# Patient Record
Sex: FEMALE | Race: WHITE | NOT HISPANIC OR LATINO | Employment: OTHER | URBAN - METROPOLITAN AREA
[De-identification: names, ages, dates, MRNs, and addresses within clinical notes are randomized per-mention and may not be internally consistent; named-entity substitution may affect disease eponyms.]

---

## 2020-06-20 ENCOUNTER — NURSE TRIAGE (OUTPATIENT)
Dept: NURSING | Facility: CLINIC | Age: 65
End: 2020-06-20

## 2020-06-20 NOTE — TELEPHONE ENCOUNTER
"Caller bit on arms while outside by some type of flying insects 3 days ago; after had swelling of arms; tried to squeeze out 'stingers\"   and used topical  Lotions for  Itching   Today arms are red and swollen and painful in at bite sites; no fever      Triage protocol reviewed   Advised assessment at  or ED within 4 hours     Caller understands and will comply   Shruthi Dhillon RN  FNA      Additional Information    Negative: [1] Life-threatening reaction (anaphylaxis) in the past to same insect bite AND [2] < 2 hours since bite    Negative: Passed out (i.e., lost consciousness, collapsed and was not responding)    Negative: Difficulty breathing or wheezing    Negative: [1] Hoarseness or cough AND [2] sudden onset following bite    Negative: [1] Difficulty swallowing or slurred speech AND [2] sudden onset following bite    Negative: Sounds like a life-threatening emergency to the triager    Negative: Bee sting(s)    Negative: Spider bite(s)    Negative: Tick bite(s)    Negative: Mosquito bite(s)    Negative: Bed bug bite(s)    Negative: Boil suspected (i.e., painful red lump and NO insect bite)    Negative: Doesn't sound like an insect bite    Negative: Patient sounds very sick or weak to the triager    [1] Red streak or red line AND [2] length > 2 inches (5 cm)    Protocols used: INSECT BITE-A-AH      "

## 2020-09-22 ENCOUNTER — OFFICE VISIT (OUTPATIENT)
Dept: INTERNAL MEDICINE | Facility: CLINIC | Age: 65
End: 2020-09-22
Payer: MEDICARE

## 2020-09-22 VITALS
RESPIRATION RATE: 16 BRPM | OXYGEN SATURATION: 100 % | TEMPERATURE: 97.6 F | WEIGHT: 148 LBS | BODY MASS INDEX: 26.22 KG/M2 | SYSTOLIC BLOOD PRESSURE: 142 MMHG | HEART RATE: 86 BPM | DIASTOLIC BLOOD PRESSURE: 86 MMHG | HEIGHT: 63 IN

## 2020-09-22 DIAGNOSIS — J30.1 ALLERGIC RHINITIS DUE TO POLLEN, UNSPECIFIED SEASONALITY: ICD-10-CM

## 2020-09-22 DIAGNOSIS — Z23 NEED FOR PROPHYLACTIC VACCINATION AND INOCULATION AGAINST INFLUENZA: ICD-10-CM

## 2020-09-22 DIAGNOSIS — E11.9 TYPE 2 DIABETES MELLITUS WITHOUT COMPLICATION, WITHOUT LONG-TERM CURRENT USE OF INSULIN (H): Primary | ICD-10-CM

## 2020-09-22 DIAGNOSIS — E78.5 HYPERLIPIDEMIA LDL GOAL <100: ICD-10-CM

## 2020-09-22 DIAGNOSIS — I10 ESSENTIAL HYPERTENSION: ICD-10-CM

## 2020-09-22 DIAGNOSIS — F33.8 SEASONAL DEPRESSION (H): ICD-10-CM

## 2020-09-22 PROCEDURE — G0008 ADMIN INFLUENZA VIRUS VAC: HCPCS | Performed by: INTERNAL MEDICINE

## 2020-09-22 PROCEDURE — 90662 IIV NO PRSV INCREASED AG IM: CPT | Performed by: INTERNAL MEDICINE

## 2020-09-22 PROCEDURE — 99204 OFFICE O/P NEW MOD 45 MIN: CPT | Mod: 25 | Performed by: INTERNAL MEDICINE

## 2020-09-22 RX ORDER — SERTRALINE HYDROCHLORIDE 100 MG/1
100 TABLET, FILM COATED ORAL DAILY
Qty: 90 TABLET | Refills: 3 | Status: SHIPPED | OUTPATIENT
Start: 2020-09-22 | End: 2021-12-31

## 2020-09-22 RX ORDER — CETIRIZINE HYDROCHLORIDE 10 MG/1
10 TABLET ORAL DAILY
Qty: 90 TABLET | Refills: 3 | Status: SHIPPED | OUTPATIENT
Start: 2020-09-22 | End: 2022-01-11

## 2020-09-22 RX ORDER — ROSUVASTATIN CALCIUM 20 MG/1
20 TABLET, COATED ORAL DAILY
Qty: 90 TABLET | Refills: 3 | Status: SHIPPED | OUTPATIENT
Start: 2020-09-22 | End: 2021-12-31

## 2020-09-22 RX ORDER — LOSARTAN POTASSIUM AND HYDROCHLOROTHIAZIDE 25; 100 MG/1; MG/1
1 TABLET ORAL DAILY
Qty: 90 TABLET | Refills: 3 | Status: SHIPPED | OUTPATIENT
Start: 2020-09-22 | End: 2021-12-31

## 2020-09-22 ASSESSMENT — PAIN SCALES - GENERAL: PAINLEVEL: NO PAIN (0)

## 2020-09-22 ASSESSMENT — MIFFLIN-ST. JEOR: SCORE: 1176.48

## 2020-09-22 NOTE — PROGRESS NOTES
"Subjective     Shruthi Rivas is a 65 year old female who presents to clinic today for the following health issues:    HPI     Chief Complaint   Patient presents with     Establish Care     Pre-diabetes  Htn  Lipids  Depression/Anxiety     Retired this last month from [employer] [place] getting care there.  Now on medicare. Single, .   Daughter in [place], son in [place].  She now lives in [place].    Pre diabetes and tried metformin for 3 months, bothered stomach and had to quit that.  Does keto type diet.  a1c has been around 6.7 last one was lower 6.6   Lost weight and walking for 45 minutes.      Some elevated white blood counts. 11-15 range     Seasonal depression in the winter.    Mammogram in 12/19  Has done the stool test.     Past Medical History:   Diagnosis Date     Depressive disorder      Essential hypertension      Type 2 diabetes mellitus (H)      Current Outpatient Medications   Medication     aspirin (ASA) 81 MG EC tablet     cetirizine (ZYRTEC) 10 MG tablet     losartan-hydrochlorothiazide (HYZAAR) 100-25 MG tablet     rosuvastatin (CRESTOR) 20 MG tablet     sertraline (ZOLOFT) 100 MG tablet     No current facility-administered medications for this visit.      Social History     Tobacco Use     Smoking status: Never Smoker     Smokeless tobacco: Never Used   Substance Use Topics     Alcohol use: Not Currently     Drug use: Never     No alcohol.     Review of Systems   Constitutional, HEENT, cardiovascular, pulmonary, gi and gu systems are negative, except as otherwise noted.      Objective    BP (!) 142/86   Pulse 86   Temp 97.6  F (36.4  C) (Temporal)   Resp 16   Ht 1.594 m (5' 2.75\")   Wt 67.1 kg (148 lb)   SpO2 100%   BMI 26.43 kg/m    Body mass index is 26.43 kg/m .  Physical Exam   GENERAL: healthy, alert and no distress  RESP: lungs clear to auscultation - no rales, rhonchi or wheezes  CV: regular rate and rhythm, normal S1 S2, no S3 or S4, no murmur, click or rub, no peripheral " edema and peripheral pulses strong  MS: no gross musculoskeletal defects noted, no edema  SKIN: no suspicious lesions or rashes  NEURO: Normal strength and tone, mentation intact and speech normal  PSYCH: mentation appears normal, affect normal/bright            Assessment & Plan     Shruthi was seen today for establish care.    Diagnoses and all orders for this visit:    Type 2 diabetes mellitus without complication, without long-term current use of insulin (H)  -     rosuvastatin (CRESTOR) 20 MG tablet; Take 1 tablet (20 mg) by mouth daily  -     **A1C FUTURE anytime; Future  -     **Comprehensive metabolic panel FUTURE anytime; Future  -     **CBC with platelets FUTURE anytime; Future  -     Lipid panel reflex to direct LDL Fasting; Future  -     Albumin Random Urine Quantitative with Creat Ratio; Future    Essential hypertension  -     losartan-hydrochlorothiazide (HYZAAR) 100-25 MG tablet; Take 1 tablet by mouth daily  -     Albumin Random Urine Quantitative with Creat Ratio; Future    Hyperlipidemia LDL goal <100  -     rosuvastatin (CRESTOR) 20 MG tablet; Take 1 tablet (20 mg) by mouth daily  -     Lipid panel reflex to direct LDL Fasting; Future    Seasonal depression (H)  -     sertraline (ZOLOFT) 100 MG tablet; Take 1 tablet (100 mg) by mouth daily    Allergic rhinitis due to pollen, unspecified seasonality  -     cetirizine (ZYRTEC) 10 MG tablet; Take 1 tablet (10 mg) by mouth daily      This is a new patient to our clinic.  She has type 2 diabetes which is diet controlled.  A1c was 6.6, 6.7.  We will recheck her A1c in 2 months.  She will continue Crestor, add an aspirin.  Needs to get her blood pressure little better controlled.    Hypertension she is on losartan hydrochlorothiazide, we did refill this, blood pressure is 142/86 and hopefully with weight loss, exercise it will come down.    Hyperlipidemia treated with Crestor doing well we will check her lipids in December.    Seasonal depression patient  "is on Zoloft 100 mg a day seems to be doing okay today we will continue to follow this throughout the winter season.    She is given a flu shot.    She should follow-up in 2 months for labs and then probable clinic visit.          BMI:   Estimated body mass index is 26.43 kg/m  as calculated from the following:    Height as of this encounter: 1.594 m (5' 2.75\").    Weight as of this encounter: 67.1 kg (148 lb).       Return in about 2 months (around 11/22/2020) for Lab Work.    Luis Alston MD  [facility]    "

## 2020-09-22 NOTE — PROGRESS NOTES
Prior to immunization administration, verified patients identity using patient s name and date of birth. Please see Immunization Activity for additional information.     Screening Questionnaire for Adult Immunization    Are you sick today?   No   Do you have allergies to medications, food, a vaccine component or latex?   Yes   Have you ever had a serious reaction after receiving a vaccination?   No   Do you have a long-term health problem with heart, lung, kidney, or metabolic disease (e.g., diabetes), asthma, a blood disorder, no spleen, complement component deficiency, a cochlear implant, or a spinal fluid leak?  Are you on long-term aspirin therapy?   No   Do you have cancer, leukemia, HIV/AIDS, or any other immune system problem?   No   Do you have a parent, brother, or sister with an immune system problem?   No   In the past 3 months, have you taken medications that affect  your immune system, such as prednisone, other steroids, or anticancer drugs; drugs for the treatment of rheumatoid arthritis, Crohn s disease, or psoriasis; or have you had radiation treatments?   No   Have you had a seizure, or a brain or other nervous system problem?   No   During the past year, have you received a transfusion of blood or blood    products, or been given immune (gamma) globulin or antiviral drug?   No   For women: Are you pregnant or is there a chance you could become       pregnant during the next month?   No   Have you received any vaccinations in the past 4 weeks?   No     Immunization questionnaire was positive for at least one answer.  Notified Yes.        Per orders of Dr. Luis Alston, injection of HD Influenza given by Miriam Trean CMA. Patient instructed to remain in clinic for 15 minutes afterwards, and to report any adverse reaction to me immediately.       Screening performed by Miriam Teran CMA on 9/22/2020 at 10:51 AM.

## 2020-12-01 DIAGNOSIS — I10 ESSENTIAL HYPERTENSION: ICD-10-CM

## 2020-12-01 DIAGNOSIS — E11.9 TYPE 2 DIABETES MELLITUS WITHOUT COMPLICATION, WITHOUT LONG-TERM CURRENT USE OF INSULIN (H): ICD-10-CM

## 2020-12-01 DIAGNOSIS — E78.5 HYPERLIPIDEMIA LDL GOAL <100: ICD-10-CM

## 2020-12-01 RX ORDER — LOSARTAN POTASSIUM AND HYDROCHLOROTHIAZIDE 25; 100 MG/1; MG/1
1 TABLET ORAL DAILY
Qty: 90 TABLET | Refills: 3 | Status: CANCELLED | OUTPATIENT
Start: 2020-12-01

## 2020-12-01 RX ORDER — ROSUVASTATIN CALCIUM 20 MG/1
20 TABLET, COATED ORAL DAILY
Qty: 90 TABLET | Refills: 3 | Status: CANCELLED | OUTPATIENT
Start: 2020-12-01

## 2020-12-03 NOTE — TELEPHONE ENCOUNTER
Both medications (Losartan-hydrochlorothiazide and Crestor) were refilled for a year in September 2020.    JESUS MayN, RN  [facility]

## 2021-01-04 ENCOUNTER — HEALTH MAINTENANCE LETTER (OUTPATIENT)
Age: 66
End: 2021-01-04

## 2021-01-08 DIAGNOSIS — E78.5 HYPERLIPIDEMIA LDL GOAL <100: ICD-10-CM

## 2021-01-08 DIAGNOSIS — E11.9 TYPE 2 DIABETES MELLITUS WITHOUT COMPLICATION, WITHOUT LONG-TERM CURRENT USE OF INSULIN (H): ICD-10-CM

## 2021-01-08 DIAGNOSIS — I10 ESSENTIAL HYPERTENSION: ICD-10-CM

## 2021-01-08 LAB
ALBUMIN SERPL-MCNC: 4.3 G/DL (ref 3.4–5)
ALP SERPL-CCNC: 103 U/L (ref 40–150)
ALT SERPL W P-5'-P-CCNC: 43 U/L (ref 0–50)
ANION GAP SERPL CALCULATED.3IONS-SCNC: 5 MMOL/L (ref 3–14)
AST SERPL W P-5'-P-CCNC: 13 U/L (ref 0–45)
BILIRUB SERPL-MCNC: 0.7 MG/DL (ref 0.2–1.3)
BUN SERPL-MCNC: 24 MG/DL (ref 7–30)
CALCIUM SERPL-MCNC: 9.7 MG/DL (ref 8.5–10.1)
CHLORIDE SERPL-SCNC: 106 MMOL/L (ref 94–109)
CHOLEST SERPL-MCNC: 223 MG/DL
CO2 SERPL-SCNC: 30 MMOL/L (ref 20–32)
CREAT SERPL-MCNC: 0.7 MG/DL (ref 0.52–1.04)
CREAT UR-MCNC: 279 MG/DL
ERYTHROCYTE [DISTWIDTH] IN BLOOD BY AUTOMATED COUNT: 13.4 % (ref 10–15)
GFR SERPL CREATININE-BSD FRML MDRD: 90 ML/MIN/{1.73_M2}
GLUCOSE SERPL-MCNC: 125 MG/DL (ref 70–99)
HBA1C MFR BLD: 6.1 % (ref 0–5.6)
HCT VFR BLD AUTO: 43.9 % (ref 35–47)
HDLC SERPL-MCNC: 68 MG/DL
HGB BLD-MCNC: 14.2 G/DL (ref 11.7–15.7)
LDLC SERPL CALC-MCNC: 107 MG/DL
MCH RBC QN AUTO: 28.6 PG (ref 26.5–33)
MCHC RBC AUTO-ENTMCNC: 32.3 G/DL (ref 31.5–36.5)
MCV RBC AUTO: 88 FL (ref 78–100)
MICROALBUMIN UR-MCNC: 71 MG/L
MICROALBUMIN/CREAT UR: 25.34 MG/G CR (ref 0–25)
NONHDLC SERPL-MCNC: 155 MG/DL
PLATELET # BLD AUTO: 259 10E9/L (ref 150–450)
POTASSIUM SERPL-SCNC: 3.9 MMOL/L (ref 3.4–5.3)
PROT SERPL-MCNC: 8.2 G/DL (ref 6.8–8.8)
RBC # BLD AUTO: 4.97 10E12/L (ref 3.8–5.2)
SODIUM SERPL-SCNC: 141 MMOL/L (ref 133–144)
TRIGL SERPL-MCNC: 242 MG/DL
WBC # BLD AUTO: 11.5 10E9/L (ref 4–11)

## 2021-01-08 PROCEDURE — 36415 COLL VENOUS BLD VENIPUNCTURE: CPT | Performed by: INTERNAL MEDICINE

## 2021-01-08 PROCEDURE — 83036 HEMOGLOBIN GLYCOSYLATED A1C: CPT | Performed by: INTERNAL MEDICINE

## 2021-01-08 PROCEDURE — 85027 COMPLETE CBC AUTOMATED: CPT | Performed by: INTERNAL MEDICINE

## 2021-01-08 PROCEDURE — 80053 COMPREHEN METABOLIC PANEL: CPT | Performed by: INTERNAL MEDICINE

## 2021-01-08 PROCEDURE — 80061 LIPID PANEL: CPT | Performed by: INTERNAL MEDICINE

## 2021-01-08 PROCEDURE — 82043 UR ALBUMIN QUANTITATIVE: CPT | Performed by: INTERNAL MEDICINE

## 2021-01-10 ENCOUNTER — HOSPITAL ENCOUNTER (EMERGENCY)
Facility: CLINIC | Age: 66
Discharge: HOME | End: 2021-01-10
Attending: PHYSICIAN ASSISTANT | Admitting: PHYSICIAN ASSISTANT
Payer: MEDICARE

## 2021-01-10 VITALS
SYSTOLIC BLOOD PRESSURE: 134 MMHG | DIASTOLIC BLOOD PRESSURE: 87 MMHG | TEMPERATURE: 97.7 F | HEART RATE: 84 BPM | OXYGEN SATURATION: 97 % | RESPIRATION RATE: 18 BRPM

## 2021-01-10 DIAGNOSIS — S61.411A LACERATION OF RIGHT HAND: ICD-10-CM

## 2021-01-10 PROCEDURE — 99283 EMERGENCY DEPT VISIT LOW MDM: CPT | Performed by: PHYSICIAN ASSISTANT

## 2021-01-10 PROCEDURE — 12001 RPR S/N/AX/GEN/TRNK 2.5CM/<: CPT | Performed by: PHYSICIAN ASSISTANT

## 2021-01-10 PROCEDURE — 99282 EMERGENCY DEPT VISIT SF MDM: CPT | Mod: 25 | Performed by: PHYSICIAN ASSISTANT

## 2021-01-10 NOTE — ED AVS SNAPSHOT
[facility] Emergency Dept  [address] [place] [address]  Phone: [phone]  Fax: [phone]                                    Shruthi Rivas   MRN: [id number]    Department: [facility] Emergency Dept   Date of Visit: 1/10/2021           After Visit Summary Signature Page    I have received my discharge instructions, and my questions have been answered. I have discussed any challenges I see with this plan with the nurse or doctor.    ..........................................................................................................................................  Patient/Patient Representative Signature      ..........................................................................................................................................  Patient Representative Print Name and Relationship to Patient    ..................................................               ................................................  Date                                   Time    ..........................................................................................................................................  Reviewed by Signature/Title    ...................................................              ..............................................  Date                                               Time          22EPIC Rev 08/18

## 2021-01-11 NOTE — DISCHARGE INSTRUCTIONS
Please have the stitches removed in 1 week.  Cover with a bandage and antibiotic ointment.  Wash under running warm soapy water but do not submerge in water until stitches are removed.  If you develop any worsening concerns please do not hesitate to return to the emergency department.    Thank you for choosing [facility]'s Emergency Department. It was a pleasure taking care of you today. If you have any questions, please call [phone].    Marley Galvan PA-C

## 2021-01-11 NOTE — ED PROVIDER NOTES
History     Chief Complaint   Patient presents with     Laceration     HPI  Shruthi Rivas is a 65 year old female who presents to the emergency department for concerns of a laceration. The patient was cleaning a new  blade when it accidentally cut her right palm.  Bleeding was controlled prior to arrival.  She has normal range of motion of her fingers.  She denies any other injuries.  Last tetanus 2012.      Allergies:  Allergies   Allergen Reactions     Codeine      Vomiting      Dilaudid [Hydromorphone]      Eucalyptus Oil      Ibuprofen      Up set stomach, sweating, throw up, diarrhea      Morphine      vomiting     No Clinical Screening - See Comments      IV dye      Pravastatin      Shellfish-Derived Products      Sulfa Drugs        Problem List:    There are no active problems to display for this patient.       Past Medical History:    Past Medical History:   Diagnosis Date     Depressive disorder      Essential hypertension      Type 2 diabetes mellitus (H)        Past Surgical History:    Past Surgical History:   Procedure Laterality Date     BACK SURGERY  1993     WRIST SURGERY      fracture and then a plate.        Family History:    History reviewed. No pertinent family history.    Social History:  Marital Status:  Single [1]  Social History     Tobacco Use     Smoking status: Never Smoker     Smokeless tobacco: Never Used   Substance Use Topics     Alcohol use: Not Currently     Drug use: Never        Medications:         aspirin (ASA) 81 MG EC tablet       cetirizine (ZYRTEC) 10 MG tablet       losartan-hydrochlorothiazide (HYZAAR) 100-25 MG tablet       rosuvastatin (CRESTOR) 20 MG tablet       sertraline (ZOLOFT) 100 MG tablet          Review of Systems   All other systems reviewed and are negative.      Physical Exam   BP: 134/87  Pulse: 84  Temp: 97.7  F (36.5  C)  Resp: 18  SpO2: 97 %      Physical Exam  Vitals signs and nursing note reviewed.   Constitutional:       General: She is  not in acute distress.     Appearance: Normal appearance. She is not ill-appearing, toxic-appearing or diaphoretic.   HENT:      Head: Normocephalic and atraumatic.      Nose: Nose normal.   Eyes:      Extraocular Movements: Extraocular movements intact.      Conjunctiva/sclera: Conjunctivae normal.   Neck:      Musculoskeletal: Neck supple.   Cardiovascular:      Pulses: Normal pulses.   Pulmonary:      Effort: Pulmonary effort is normal. No respiratory distress.   Musculoskeletal:         General: No deformity.      Comments: Right hand: 0.5 cm laceration to the palmar hand overlying the first metacarpal.  No active bleeding, well approximated but some subcutaneous tissue exposed.  Normal range of motion of all fingers with normal sensation.  Brisk capillary refill.   Skin:     General: Skin is warm and dry.   Neurological:      General: No focal deficit present.      Mental Status: She is alert and oriented to person, place, and time. Mental status is at baseline.   Psychiatric:         Mood and Affect: Mood normal.         Behavior: Behavior normal.         ED [facility]    -Laceration Repair    Date/Time: 1/10/2021 8:58 PM  Performed by: Marley Galvan PA-C  Authorized by: Marley Galvan PA-C       ANESTHESIA (see MAR for exact dosages):     Anesthesia method:  Local infiltration    Local anesthetic:  Lidocaine 1% WITH epi  LACERATION DETAILS     Location:  Hand    Hand location:  R palm    Length (cm):  1.5    REPAIR TYPE:     Repair type:  Simple      EXPLORATION:     Wound exploration: wound explored through full range of motion and entire depth of wound probed and visualized      Wound extent: no signs of injury, no nerve damage, no tendon damage, no underlying fracture and no vascular damage      TREATMENT:     Area cleansed with:  Saline    Amount of cleaning:  Standard    Irrigation solution:  Sterile saline    SKIN REPAIR     Repair method:   Sutures    Suture size:  5-0    Suture material:  Nylon    Suture technique:  Simple interrupted    Number of sutures:  3    APPROXIMATION     Approximation:  Close    POST-PROCEDURE DETAILS     Dressing:  Adhesive bandage and antibiotic ointment      PROCEDURE   Patient Tolerance:  Patient tolerated the procedure well with no immediate complications          No results found for this or any previous visit (from the past 24 hour(s)).    Medications - No data to display       Assessments & Plan (with Medical Decision Making)  Shruthi Rivas is a 65 year old female who presented to the ED with a laceration to the right hand.  1.5 cm laceration noted, slightly gaping to right palm.  No underlying vascular injury or tendon injury.  Wound was repaired as detailed above.  Tetanus is currently up-to-date.  I advised suture removal in 1 week.  Patient was provided instructions on wound cares as well as indications of when to return to the ED.  All questions answered and patient discharged home in suitable condition.     I have reviewed the nursing notes.    I have reviewed the findings, diagnosis, plan and need for follow up with the patient.    Discharge Medication List as of 1/10/2021  8:43 PM          Final diagnoses:   Laceration of right hand     Note: Chart documentation done in part with Dragon Voice Recognition software. Although reviewed after completion, some word and grammatical errors may remain.    1/10/2021   [facility] EMERGENCY DEPT     Marley Galvan PA-C  01/10/21 2059

## 2021-01-19 ENCOUNTER — E-VISIT (OUTPATIENT)
Dept: URGENT CARE | Facility: URGENT CARE | Age: 66
End: 2021-01-19
Payer: MEDICARE

## 2021-01-19 DIAGNOSIS — J01.90 ACUTE SINUSITIS WITH SYMPTOMS > 10 DAYS: Primary | ICD-10-CM

## 2021-01-19 PROCEDURE — 99421 OL DIG E/M SVC 5-10 MIN: CPT | Performed by: PHYSICIAN ASSISTANT

## 2021-01-19 RX ORDER — FLUTICASONE PROPIONATE 50 MCG
1 SPRAY, SUSPENSION (ML) NASAL 2 TIMES DAILY
Qty: 18.2 ML | Refills: 3 | Status: SHIPPED | OUTPATIENT
Start: 2021-01-19 | End: 2021-01-20

## 2021-01-19 RX ORDER — DOXYCYCLINE HYCLATE 100 MG
100 TABLET ORAL 2 TIMES DAILY
Qty: 20 TABLET | Refills: 0 | Status: SHIPPED | OUTPATIENT
Start: 2021-01-19 | End: 2021-01-29

## 2021-01-19 NOTE — PATIENT INSTRUCTIONS
Dear Shruthi Rivas    After reviewing your responses, I've been able to diagnose you with?a sinus infection caused by bacteria.?     Based on your responses and diagnosis, I have prescribed doxycyline to treat your symptoms. I have sent this to your pharmacy.?     It is also important to stay well hydrated, get lots of rest and take over-the-counter decongestants,?tylenol?or ibuprofen if you?are able to?take those medications per your primary care provider to help relieve discomfort.?     It is important that you take?all of?your prescribed medication even if your symptoms are improving after a few doses.? Taking?all of?your medicine helps prevent the symptoms from returning.?     If your symptoms worsen, you develop severe headache, vomiting, high fever (>102), or are not improving in 7 days, please contact your primary care provider for an appointment or visit any of our convenient Walk-in Care or Urgent Care Centers to be seen which can be found on our website?here.?     Thanks again for choosing?us?as your health care partner,?   ?  Apple Nguyen PA-C?     Sinusitis (Antibiotic Treatment)    The sinuses are air-filled spaces within the bones of the face. They connect to the inside of the nose. Sinusitis is an inflammation of the tissue that lines the sinuses. Sinusitis can occur during a cold. It can also happen due to allergies to pollens and other particles in the air. Sinusitis can cause symptoms of sinus congestion and a feeling of fullness. A sinus infection causes fever, headache, and facial pain. There is often green or yellow fluid draining from the nose or into the back of the throat (post-nasal drip). You have been given antibiotics to treat this condition.   Home care    Take the full course of antibiotics as instructed. Don't stop taking them, even when you feel better.    Drink plenty of water, hot tea, and other liquids as directed by the healthcare provider. This may help thin nasal mucus.  It also may help your sinuses drain fluids.    Heat may help soothe painful areas of your face. Use a towel soaked in hot water. Or,  the shower and direct the warm spray onto your face. Using a vaporizer along with a menthol rub at night may also help soothe symptoms.     An expectorant with guaifenesin may help thin nasal mucus and help your sinuses drain fluids. Talk with your provider or pharmacists before taking an over-the-counter (OTC) medicine if you have any questions about it or its side effects..    You can use an OTC decongestant, unless a similar medicine was prescribed to you. Nasal sprays work the fastest. Use one that contains phenylephrine or oxymetazoline. First blow your nose gently. Then use the spray. Don't use these medicines more often than directed on the label. If you do, your symptoms may get worse. You may also take pills that contain pseudoephedrine. Don t use products that combine multiple medicines. This is because side effects may be increased. Read labels. You can also ask the pharmacist for help. (People with high blood pressure should not use decongestants. They can raise blood pressure.) Talk with your provider or pharmacist if you have any questions about the medicine..    OTC antihistamines may help if allergies contributed to your sinusitis. Talk with your provider or pharmacist if you have any questions about the medicine..    Don't use nasal rinses or irrigation during an acute sinus infection, unless your healthcare provider tells you to. Rinsing may spread the infection to other areas in your sinuses.    Use acetaminophen or ibuprofen to control pain, unless another pain medicine was prescribed to you. If you have chronic liver or kidney disease or ever had a stomach ulcer, talk with your healthcare provider before using these medicines. Never give aspirin to anyone under age 18 who is ill with a fever. It may cause severe liver damage.    Don't smoke. This can make  symptoms worse.    Follow-up care  Follow up with your healthcare provider, or as advised.   When to seek medical advice  Call your healthcare provider if any of these occur:     Facial pain or headache that gets worse    Stiff neck    Unusual drowsiness or confusion    Swelling of your forehead or eyelids    Symptoms don't go away in 10 days    Vision problems, such as blurred or double vision    Fever of 100.4 F (38 C) or higher, or as directed by your healthcare provider  Call 911  Call 911 if any of these occur:     Seizure    Trouble breathing    Feeling dizzy or faint    Fingernails, skin or lips look blue, purple , or gray  Prevention  Here are steps you can take to help prevent an infection:     Keep good hand washing habits.    Don t have close contact with people who have sore throats, colds, or other upper respiratory infections.    Don t smoke, and stay away from secondhand smoke.    Stay up to date with of your vaccines.  [employer] last reviewed this educational content on 12/1/2019 2000-2020 [employer] [address] [place] [address], [place] [address]. All rights reserved. This information is not intended as a substitute for professional medical care. Always follow your healthcare professional's instructions.

## 2021-01-20 ENCOUNTER — TELEPHONE (OUTPATIENT)
Dept: URGENT CARE | Facility: URGENT CARE | Age: 66
End: 2021-01-20

## 2021-01-20 DIAGNOSIS — J01.90 ACUTE SINUSITIS WITH SYMPTOMS > 10 DAYS: ICD-10-CM

## 2021-01-20 RX ORDER — FLUTICASONE PROPIONATE 50 MCG
1 SPRAY, SUSPENSION (ML) NASAL 2 TIMES DAILY
Qty: 18.2 ML | Refills: 3 | Status: SHIPPED | OUTPATIENT
Start: 2021-01-20 | End: 2021-01-20

## 2021-01-20 RX ORDER — FLUTICASONE PROPIONATE 50 MCG
1 SPRAY, SUSPENSION (ML) NASAL 2 TIMES DAILY
Qty: 48 ML | Refills: 2 | Status: SHIPPED | OUTPATIENT
Start: 2021-01-20 | End: 2021-03-12

## 2021-01-20 NOTE — TELEPHONE ENCOUNTER
[facility] asking if 90 day Rx can be dispensed for the Fluticasone 50 mg nasal spray  Original quantity: 16  Quantity requested 48

## 2021-02-12 ENCOUNTER — OFFICE VISIT (OUTPATIENT)
Dept: INTERNAL MEDICINE | Facility: CLINIC | Age: 66
End: 2021-02-12
Payer: MEDICARE

## 2021-02-12 VITALS
DIASTOLIC BLOOD PRESSURE: 66 MMHG | HEART RATE: 102 BPM | OXYGEN SATURATION: 98 % | WEIGHT: 140 LBS | BODY MASS INDEX: 24.8 KG/M2 | HEIGHT: 63 IN | RESPIRATION RATE: 16 BRPM | SYSTOLIC BLOOD PRESSURE: 108 MMHG | TEMPERATURE: 97 F

## 2021-02-12 DIAGNOSIS — D17.30 LIPOMA OF SKIN AND SUBCUTANEOUS TISSUE: Primary | ICD-10-CM

## 2021-02-12 PROCEDURE — 99213 OFFICE O/P EST LOW 20 MIN: CPT | Performed by: INTERNAL MEDICINE

## 2021-02-12 ASSESSMENT — MIFFLIN-ST. JEOR: SCORE: 1140.2

## 2021-02-12 ASSESSMENT — PAIN SCALES - GENERAL: PAINLEVEL: NO PAIN (0)

## 2021-02-12 NOTE — PROGRESS NOTES
"    Assessment & Plan     Lipoma of skin and subcutaneous tissue  Unsure if this is what is causing the lump she felt.  I think is the upper back lesion 2 and half inches in size which is lipoma she is reassured is benign no sign of drainage.  Lower back may be had a cyst that ruptured but I assume it was a lipoma as well.  Maybe she just feels in a different location.  She has multiple lipomas, offered her general surgery consult to get these removed in the future once her been cancer on her face is cared for.                   No follow-ups on file.    Luis Alston MD  [facility]    Shravan Hawkins is a 65 year old who presents for the following health issues     HPI       Chief Complaint   Patient presents with     Mass     check lump by spine, massage therapist noticed it     Lump by her spine found on massage.  Was a little sore when pushed on.  Patient hadn't noticed it just found this week.      Basal cell carcinoma under left eye.      Review of Systems         Objective    /66   Pulse 102   Temp 97  F (36.1  C) (Temporal)   Resp 16   Ht 1.594 m (5' 2.75\")   Wt 63.5 kg (140 lb)   SpO2 98%   BMI 25.00 kg/m    Body mass index is 25 kg/m .  Physical Exam   NAD  Small lipoma 1 cm in size in the left clavicular area  Right lower back has no mass palpable today.  Right upper back has a 2 and half inch palpable lipoma.                "

## 2021-03-12 ENCOUNTER — OFFICE VISIT (OUTPATIENT)
Dept: INTERNAL MEDICINE | Facility: CLINIC | Age: 66
End: 2021-03-12
Payer: MEDICARE

## 2021-03-12 VITALS
OXYGEN SATURATION: 97 % | SYSTOLIC BLOOD PRESSURE: 122 MMHG | HEART RATE: 98 BPM | WEIGHT: 143 LBS | BODY MASS INDEX: 25.53 KG/M2 | TEMPERATURE: 97.3 F | RESPIRATION RATE: 16 BRPM | DIASTOLIC BLOOD PRESSURE: 74 MMHG

## 2021-03-12 DIAGNOSIS — C44.310 BCC (BASAL CELL CARCINOMA), FACE: ICD-10-CM

## 2021-03-12 DIAGNOSIS — Z01.818 PREOP GENERAL PHYSICAL EXAM: Primary | ICD-10-CM

## 2021-03-12 PROCEDURE — 99213 OFFICE O/P EST LOW 20 MIN: CPT | Performed by: INTERNAL MEDICINE

## 2021-03-12 ASSESSMENT — PAIN SCALES - GENERAL: PAINLEVEL: NO PAIN (0)

## 2021-03-12 NOTE — PATIENT INSTRUCTIONS

## 2021-03-12 NOTE — PROGRESS NOTES
[address] [address]  Phone: [phone]  Primary Provider: Luis Alston  Pre-op Performing Provider: LUIS ALSTON    PREOPERATIVE EVALUATION:  Today's date: 3/12/2021    Shruthi Rivas is a 65 year old female who presents for a preoperative evaluation.    Surgical Information:  Surgery/Procedure: MOH's remove basal cell carcinoma   Surgery Location: [facility]  Surgeon: Dr. Galindo  Surgery Date: 3/18/2021  Time of Surgery: 7:00 am arrival  Where patient plans to recover: At home with family  Fax number for surgical facility: [phone]    Type of Anesthesia Anticipated: tbd    Assessment & Plan     The proposed surgical procedure is considered INTERMEDIATE risk.    Problem List Items Addressed This Visit     None      Visit Diagnoses     Preop general physical exam    -  Primary    BCC (basal cell carcinoma), face            Patient is clear for her preoperative evaluation.  Her labs from January were stable and her sent with her preop.  She she will hold her medications on the day of surgery.  No reason for an EKG as she has no cardiac symptoms or cardiac history.      Possible Sleep Apnea:  Nursing to monitor post operative.         Risks and Recommendations:  The patient has the following additional risks and recommendations for perioperative complications:   - No identified additional risk factors other than previously addressed    Medication Instructions:  Hold all medications on day of surgery    RECOMMENDATION:  APPROVAL GIVEN to proceed with proposed procedure, without further diagnostic evaluation.                      Subjective     HPI related to upcoming procedure: Left basal cell carcinoma, may need plastic surgery to fix so may need sedation and anesthesia.     Preop Questions 3/9/2021   1. Have you ever had a heart attack or stroke? No   2. Have you ever had surgery on your heart or blood vessels, such as a  stent placement, a coronary artery bypass, or surgery on an artery in your head, neck, heart, or legs? No   3. Do you have chest pain with activity? No   4. Do you have a history of  heart failure? No   5. Do you currently have a cold, bronchitis or symptoms of other infection? No   6. Do you have a cough, shortness of breath, or wheezing? No   7. Do you or anyone in your family have previous history of blood clots? YES -    8. Do you or does anyone in your family have a serious bleeding problem such as prolonged bleeding following surgeries or cuts? YES -    9. Have you ever had problems with anemia or been told to take iron pills? No   10. Have you had any abnormal blood loss such as black, tarry or bloody stools, or abnormal vaginal bleeding? No   11. Have you ever had a blood transfusion? YES -    11a. Have you ever had a transfusion reaction? UNKNOWN -    12. Are you willing to have a blood transfusion if it is medically needed before, during, or after your surgery? Yes   13. Have you or any of your relatives ever had problems with anesthesia? YES -    14. Do you have sleep apnea, excessive snoring or daytime drowsiness? YES - unknown   14a. Do you have a CPAP machine? No   15. Do you have any artifical heart valves or other implanted medical devices like a pacemaker, defibrillator, or continuous glucose monitor? No   16. Do you have artificial joints? No   17. Are you allergic to latex? No       Health Care Directive:  Patient does not have a Health Care Directive or Living Will:     Preoperative Review of :  Done       Status of Chronic Conditions:  HYPERLIPIDEMIA - Patient has a long history of significant Hyperlipidemia requiring medication for treatment with recent good control. Patient reports no problems or side effects with the medication.     HYPERTENSION - Patient has longstanding history of HTN , currently denies any symptoms referable to elevated blood pressure. Specifically denies chest pain,  palpitations, dyspnea, orthopnea, PND or peripheral edema. Blood pressure readings have been in normal range. Current medication regimen is as listed below. Patient denies any side effects of medication.       Review of Systems  Constitutional, neuro, ENT, endocrine, pulmonary, cardiac, gastrointestinal, genitourinary, musculoskeletal, integument and psychiatric systems are negative, except as otherwise noted.    There are no active problems to display for this patient.     Past Medical History:   Diagnosis Date     Depressive disorder      Essential hypertension      Type 2 diabetes mellitus (H)      Past Surgical History:   Procedure Laterality Date     BACK SURGERY  1993     WRIST SURGERY      fracture and then a plate.      Current Outpatient Medications   Medication Sig Dispense Refill     cetirizine (ZYRTEC) 10 MG tablet Take 1 tablet (10 mg) by mouth daily 90 tablet 3     losartan-hydrochlorothiazide (HYZAAR) 100-25 MG tablet Take 1 tablet by mouth daily 90 tablet 3     rosuvastatin (CRESTOR) 20 MG tablet Take 1 tablet (20 mg) by mouth daily 90 tablet 3     sertraline (ZOLOFT) 100 MG tablet Take 1 tablet (100 mg) by mouth daily 90 tablet 3       Allergies   Allergen Reactions     Codeine      Vomiting      Dilaudid [Hydromorphone]      Eucalyptus Oil      Ibuprofen      Up set stomach, sweating, throw up, diarrhea      Morphine      vomiting     No Clinical Screening - See Comments      IV dye      Pravastatin      Shellfish-Derived Products      Sulfa Drugs         Social History     Tobacco Use     Smoking status: Never Smoker     Smokeless tobacco: Never Used   Substance Use Topics     Alcohol use: Not Currently       History   Drug Use Unknown         Objective     /74 (Cuff Size: Adult Regular)   Pulse 98   Temp 97.3  F (36.3  C) (Temporal)   Resp 16   Wt 64.9 kg (143 lb)   SpO2 97%   BMI 25.53 kg/m      Physical Exam    GENERAL APPEARANCE: healthy, alert and no distress     NECK: no  adenopathy, no asymmetry, masses, or scars and thyroid normal to palpation     RESP: lungs clear to auscultation - no rales, rhonchi or wheezes     CV: regular rates and rhythm, normal S1 S2, no S3 or S4 and no murmur, click or rub     ABDOMEN:  soft, nontender, no HSM or masses and bowel sounds normal     MS: extremities normal- no gross deformities noted, no evidence of inflammation in joints, FROM in all extremities.     SKIN: no suspicious lesions or rashes     NEURO: Normal strength and tone, sensory exam grossly normal, mentation intact and speech normal     PSYCH: mentation appears normal. and affect normal/bright     LYMPHATICS: No cervical adenopathy    Recent Labs   Lab Test 01/08/21  0951   HGB 14.2         POTASSIUM 3.9   CR 0.70   A1C 6.1*        Diagnostics:  No labs were ordered during this visit.   No EKG required for low risk surgery (cataract, skin procedure, breast biopsy, etc).  No EKG required, no history of coronary heart disease, significant arrhythmia, peripheral arterial disease or other structural heart disease.    Revised Cardiac Risk Index (RCRI):  The patient has the following serious cardiovascular risks for perioperative complications:   - No serious cardiac risks = 0 points     RCRI Interpretation: 1 point: Class II (low risk - 0.9% complication rate)           Signed Electronically by: Luis Alston MD  Copy of this evaluation report is provided to requesting physician.

## 2021-05-01 ENCOUNTER — HEALTH MAINTENANCE LETTER (OUTPATIENT)
Age: 66
End: 2021-05-01

## 2021-07-13 ENCOUNTER — MYC MEDICAL ADVICE (OUTPATIENT)
Dept: INTERNAL MEDICINE | Facility: CLINIC | Age: 66
End: 2021-07-13

## 2021-07-13 DIAGNOSIS — E11.9 TYPE 2 DIABETES MELLITUS WITHOUT COMPLICATION, WITHOUT LONG-TERM CURRENT USE OF INSULIN (H): Primary | ICD-10-CM

## 2021-08-05 ENCOUNTER — APPOINTMENT (OUTPATIENT)
Dept: GENERAL RADIOLOGY | Facility: CLINIC | Age: 66
End: 2021-08-05
Attending: FAMILY MEDICINE
Payer: MEDICARE

## 2021-08-05 ENCOUNTER — HOSPITAL ENCOUNTER (EMERGENCY)
Facility: CLINIC | Age: 66
Discharge: HOME | End: 2021-08-05
Attending: FAMILY MEDICINE | Admitting: FAMILY MEDICINE
Payer: MEDICARE

## 2021-08-05 VITALS
BODY MASS INDEX: 24.28 KG/M2 | OXYGEN SATURATION: 97 % | WEIGHT: 136 LBS | DIASTOLIC BLOOD PRESSURE: 86 MMHG | SYSTOLIC BLOOD PRESSURE: 134 MMHG | RESPIRATION RATE: 20 BRPM | HEART RATE: 84 BPM | TEMPERATURE: 99.9 F

## 2021-08-05 DIAGNOSIS — S82.62XA CLOSED AVULSION FRACTURE OF LATERAL MALLEOLUS OF LEFT FIBULA, INITIAL ENCOUNTER: ICD-10-CM

## 2021-08-05 PROCEDURE — 99284 EMERGENCY DEPT VISIT MOD MDM: CPT | Mod: 25 | Performed by: FAMILY MEDICINE

## 2021-08-05 PROCEDURE — 73610 X-RAY EXAM OF ANKLE: CPT | Mod: LT

## 2021-08-05 PROCEDURE — 27786 TREATMENT OF ANKLE FRACTURE: CPT | Mod: 54 | Performed by: FAMILY MEDICINE

## 2021-08-05 PROCEDURE — 27786 TREATMENT OF ANKLE FRACTURE: CPT | Mod: LT | Performed by: FAMILY MEDICINE

## 2021-08-05 PROCEDURE — 73590 X-RAY EXAM OF LOWER LEG: CPT | Mod: LT

## 2021-08-05 RX ORDER — MULTIVITAMIN/IRON/FOLIC ACID 18MG-0.4MG
1 TABLET ORAL DAILY
COMMUNITY

## 2021-08-05 RX ORDER — MULTIVIT-MIN/IRON/FOLIC ACID/K 18-600-40
1000 CAPSULE ORAL DAILY
COMMUNITY

## 2021-08-05 RX ORDER — ACETAMINOPHEN 500 MG
1000 TABLET ORAL EVERY 6 HOURS PRN
COMMUNITY
End: 2021-08-05

## 2021-08-05 RX ORDER — ACETAMINOPHEN 500 MG
500-1000 TABLET ORAL EVERY 6 HOURS PRN
Refills: 0 | COMMUNITY
Start: 2021-08-05 | End: 2021-08-09

## 2021-08-05 ASSESSMENT — ENCOUNTER SYMPTOMS
JOINT SWELLING: 1
ARTHRALGIAS: 1

## 2021-08-06 NOTE — ED PROVIDER NOTES
History     Chief Complaint   Patient presents with     Leg Pain     HPI  Shruthi Rivas is a 66 year old female who presents to the emergency room today secondary to injury to her left lower leg.  Patient states that she was out on her 1 hour long walk when she stepped from the road to a grassy area causing her to twist her leg and call area on the left.  She was able to finish her walk but has had increasing swelling and pain from the ankle to the lateral left knee.  She denies any previous injury to this area.  She has noticed significant swelling to the left lateral ankle area since the incident.    Allergies:  Allergies   Allergen Reactions     Diagnostic X-Ray Materials Diarrhea and Nausea and Vomiting     Iodine Anaphylaxis     Codeine      Vomiting      Dilaudid [Hydromorphone]      Eucalyptus Oil      Ibuprofen      Up set stomach, sweating, throw up, diarrhea      Morphine      vomiting     Other [No Clinical Screening - See Comments]      IV dye      Pravastatin      Shellfish-Derived Products      Sulfa Drugs        Problem List:    There are no problems to display for this patient.       Past Medical History:    Past Medical History:   Diagnosis Date     Depressive disorder      Essential hypertension      Type 2 diabetes mellitus (H)        Past Surgical History:    Past Surgical History:   Procedure Laterality Date     BACK SURGERY  1993     WRIST SURGERY      fracture and then a plate.        Family History:    No family history on file.    Social History:  Marital Status:   [4]  Social History     Tobacco Use     Smoking status: Never Smoker     Smokeless tobacco: Never Used   Substance Use Topics     Alcohol use: Not Currently     Drug use: Never        Medications:    acetaminophen (TYLENOL) 500 MG tablet  cetirizine (ZYRTEC) 10 MG tablet  losartan-hydrochlorothiazide (HYZAAR) 100-25 MG tablet  multivitamin w/minerals (CENTRUM ADULTS) tablet  sertraline (ZOLOFT) 100 MG tablet  Vitamin  D, Cholecalciferol, 25 MCG (1000 UT) TABS  rosuvastatin (CRESTOR) 20 MG tablet          Review of Systems   Musculoskeletal: Positive for arthralgias (left lateral ankle, lower leg) and joint swelling (left ankle).   All other systems reviewed and are negative.      Physical Exam   BP: 134/86  Pulse: 84  Temp: 99.9  F (37.7  C)  Resp: 20  Weight: 61.7 kg (136 lb)  SpO2: 97 %      Physical Exam  Vitals and nursing note reviewed.   Musculoskeletal:      Left ankle: Swelling present. No deformity, ecchymosis or lacerations. Tenderness present over the lateral malleolus. Normal pulse.      Left Achilles Tendon: Normal.        Feet:    Feet:      Left foot:      Skin integrity: Skin integrity normal.         ED Course        Procedures              Critical Care time:  none               Results for orders placed or performed during the hospital encounter of 08/05/21 (from the past 24 hour(s))   XR Ankle Left G/E 3 Views    Narrative    EXAM: XR ANKLE LEFT G/E 3 VIEWS  LOCATION: [facility]  DATE/TIME: 8/5/2021 8:06 PM    INDICATION: fall, twisting, pain  COMPARISON: None.      Impression    IMPRESSION: Tiny linear radiodensity at the lateral malleolus distally is compatible with an age-indeterminate avulsion fracture. Soft tissue swelling over the lateral malleolus. There is normal joint spacing and alignment. The ankle mortise is   congruent. Small ankle joint effusion. Small plantar calcaneal enthesophyte.   XR Tibia & Fibula Left 2 Views    Narrative    EXAM: XR TIBIA and FIBULA LT 2 VW  LOCATION: [facility]  DATE/TIME: 8/5/2021 8:06 PM    INDICATION: fall, pain  COMPARISON: None.      Impression    IMPRESSION: Normal tibia and fibula.           Assessments & Plan (with Medical Decision Making)  Small avulsion fracture noted to the left lateral malleolus area.  Patient treated with a Ace wrap and stirrup splint with instructions appropriate use of both of  these modalities.  She was given written instructions regarding the ankle injury.  To follow-up in her clinic if not improved over the next 10 days.     I have reviewed the nursing notes.    I have reviewed the findings, diagnosis, plan and need for follow up with the patient.       New Prescriptions    ACETAMINOPHEN (TYLENOL) 500 MG TABLET    Take 1-2 tablets (500-1,000 mg) by mouth every 6 hours as needed for pain       Final diagnoses:   Closed avulsion fracture of lateral malleolus of left fibula, initial encounter       8/5/2021   [facility] EMERGENCY DEPT     Jose Enrique Pinedo, DO  08/05/21 2042

## 2021-08-06 NOTE — DISCHARGE INSTRUCTIONS
Please read and follow the handout(s) instructions. Return, if needed, for increased or worsening symptoms and as directed by the handout(s).    You may apply ice or cold packs to the area for 10-15 minutes every 1-2 hours as needed to relieve pain and/or swelling.    Wean from using the Ace wrap as your swelling improves.  Continue the use of the stirrup splint until your pain is resolving.

## 2021-08-06 NOTE — ED TRIAGE NOTES
Patient c/o lateral LLE swelling from just distal to her knee down to her ankle. She fell off a sidewalk while walking at 1300 today.

## 2021-08-15 ENCOUNTER — HEALTH MAINTENANCE LETTER (OUTPATIENT)
Age: 66
End: 2021-08-15

## 2021-10-10 ENCOUNTER — HEALTH MAINTENANCE LETTER (OUTPATIENT)
Age: 66
End: 2021-10-10

## 2021-10-20 ENCOUNTER — ALLIED HEALTH/NURSE VISIT (OUTPATIENT)
Dept: FAMILY MEDICINE | Facility: CLINIC | Age: 66
End: 2021-10-20
Payer: MEDICARE

## 2021-10-20 DIAGNOSIS — Z23 NEED FOR VACCINATION: Primary | ICD-10-CM

## 2021-10-20 PROCEDURE — 90750 HZV VACC RECOMBINANT IM: CPT

## 2021-10-20 PROCEDURE — 90471 IMMUNIZATION ADMIN: CPT

## 2021-10-20 PROCEDURE — 99207 PR NO CHARGE NURSE ONLY: CPT

## 2021-10-20 NOTE — PROGRESS NOTES
Shinrix given - patient previously had zostavax so needed to start series. Appt made in 2 months for second Shinrix. Patient tolerated well and no reaction to previous reactions.    Zina Arce on 10/20/2021 at 3:03 PM

## 2021-12-05 ENCOUNTER — HEALTH MAINTENANCE LETTER (OUTPATIENT)
Age: 66
End: 2021-12-05

## 2021-12-31 DIAGNOSIS — E78.5 HYPERLIPIDEMIA LDL GOAL <100: ICD-10-CM

## 2021-12-31 DIAGNOSIS — E11.9 TYPE 2 DIABETES MELLITUS WITHOUT COMPLICATION, WITHOUT LONG-TERM CURRENT USE OF INSULIN (H): ICD-10-CM

## 2021-12-31 DIAGNOSIS — I10 ESSENTIAL HYPERTENSION: ICD-10-CM

## 2021-12-31 DIAGNOSIS — F33.8 SEASONAL DEPRESSION (H): ICD-10-CM

## 2021-12-31 RX ORDER — LOSARTAN POTASSIUM AND HYDROCHLOROTHIAZIDE 25; 100 MG/1; MG/1
1 TABLET ORAL DAILY
Qty: 90 TABLET | Refills: 0 | Status: SHIPPED | OUTPATIENT
Start: 2021-12-31 | End: 2022-01-11

## 2021-12-31 RX ORDER — ROSUVASTATIN CALCIUM 20 MG/1
20 TABLET, COATED ORAL DAILY
Qty: 90 TABLET | Refills: 0 | Status: SHIPPED | OUTPATIENT
Start: 2021-12-31 | End: 2022-01-11

## 2021-12-31 RX ORDER — SERTRALINE HYDROCHLORIDE 100 MG/1
100 TABLET, FILM COATED ORAL DAILY
Qty: 90 TABLET | Refills: 0 | Status: SHIPPED | OUTPATIENT
Start: 2021-12-31 | End: 2022-01-11

## 2021-12-31 NOTE — TELEPHONE ENCOUNTER
Hyzaar  Routing refill request to provider for review/approval because:  A break in medication    Zoloft  Routing refill request to provider for review/approval because:  A break in medication      Crestor  Routing refill request to provider for review/approval because:  A break in medication    Deana Linares RN

## 2022-01-11 ENCOUNTER — OFFICE VISIT (OUTPATIENT)
Dept: INTERNAL MEDICINE | Facility: CLINIC | Age: 67
End: 2022-01-11
Payer: MEDICARE

## 2022-01-11 VITALS
HEIGHT: 63 IN | WEIGHT: 129.3 LBS | SYSTOLIC BLOOD PRESSURE: 132 MMHG | RESPIRATION RATE: 16 BRPM | BODY MASS INDEX: 22.91 KG/M2 | TEMPERATURE: 97.8 F | DIASTOLIC BLOOD PRESSURE: 80 MMHG | OXYGEN SATURATION: 98 % | HEART RATE: 95 BPM

## 2022-01-11 DIAGNOSIS — J30.1 ALLERGIC RHINITIS DUE TO POLLEN, UNSPECIFIED SEASONALITY: ICD-10-CM

## 2022-01-11 DIAGNOSIS — F33.8 SEASONAL DEPRESSION (H): ICD-10-CM

## 2022-01-11 DIAGNOSIS — E11.9 TYPE 2 DIABETES MELLITUS WITHOUT COMPLICATION, WITHOUT LONG-TERM CURRENT USE OF INSULIN (H): ICD-10-CM

## 2022-01-11 DIAGNOSIS — E78.5 HYPERLIPIDEMIA LDL GOAL <100: Primary | ICD-10-CM

## 2022-01-11 DIAGNOSIS — M79.10 MYALGIA: ICD-10-CM

## 2022-01-11 DIAGNOSIS — I10 ESSENTIAL HYPERTENSION: ICD-10-CM

## 2022-01-11 PROCEDURE — 99214 OFFICE O/P EST MOD 30 MIN: CPT | Mod: 25 | Performed by: INTERNAL MEDICINE

## 2022-01-11 PROCEDURE — 90750 HZV VACC RECOMBINANT IM: CPT | Performed by: INTERNAL MEDICINE

## 2022-01-11 PROCEDURE — 90471 IMMUNIZATION ADMIN: CPT | Performed by: INTERNAL MEDICINE

## 2022-01-11 RX ORDER — ROSUVASTATIN CALCIUM 20 MG/1
20 TABLET, COATED ORAL EVERY OTHER DAY
Qty: 90 TABLET | Refills: 3 | COMMUNITY
Start: 2022-01-11 | End: 2022-02-09

## 2022-01-11 RX ORDER — LOSARTAN POTASSIUM AND HYDROCHLOROTHIAZIDE 25; 100 MG/1; MG/1
1 TABLET ORAL DAILY
Qty: 90 TABLET | Refills: 3 | Status: SHIPPED | OUTPATIENT
Start: 2022-01-11 | End: 2022-02-09

## 2022-01-11 RX ORDER — SERTRALINE HYDROCHLORIDE 100 MG/1
100 TABLET, FILM COATED ORAL DAILY
Qty: 90 TABLET | Refills: 3 | Status: SHIPPED | OUTPATIENT
Start: 2022-01-11 | End: 2022-02-09

## 2022-01-11 RX ORDER — ROSUVASTATIN CALCIUM 20 MG/1
20 TABLET, COATED ORAL DAILY
Qty: 90 TABLET | Refills: 3 | Status: SHIPPED | OUTPATIENT
Start: 2022-01-11 | End: 2022-01-11

## 2022-01-11 RX ORDER — CETIRIZINE HYDROCHLORIDE 10 MG/1
10 TABLET ORAL DAILY
Qty: 90 TABLET | Refills: 3 | Status: SHIPPED | OUTPATIENT
Start: 2022-01-11 | End: 2022-02-09

## 2022-01-11 ASSESSMENT — PAIN SCALES - GENERAL: PAINLEVEL: NO PAIN (0)

## 2022-01-11 ASSESSMENT — MIFFLIN-ST. JEOR: SCORE: 1082.69

## 2022-01-11 NOTE — PROGRESS NOTES
Assessment & Plan     Hyperlipidemia LDL goal <100  Hyperlipidemia, patient has quite high cholesterol up to 375 about her Crestor.  She is having some muscle pains with the Crestor we will check her CK level and reduce it down to every other day dosing.  - Lipid panel reflex to direct LDL Fasting; Future  - Hepatic panel (Albumin, ALT, AST, Bili, Alk Phos, TP); Future  - CK total; Future  - rosuvastatin (CRESTOR) 20 MG tablet; Take 1 tablet (20 mg) by mouth daily    Type 2 diabetes mellitus without complication, without long-term current use of insulin (H)  Diabetes is doing okay she is on a keto diet and her A1c came down to 5.8.  - rosuvastatin (CRESTOR) 20 MG tablet; Take 1 tablet (20 mg) by mouth daily    Essential hypertension  Pressures controlled we will refill the losartan hydrochlorothiazide for the next year.  - losartan-hydrochlorothiazide (HYZAAR) 100-25 MG tablet; Take 1 tablet by mouth daily    Myalgia  Myalgias probably from her Crestor we will check a CK level and hepatic panel.  Retry the statin every other day.  - Hepatic panel (Albumin, ALT, AST, Bili, Alk Phos, TP); Future  - CK total; Future    Allergic rhinitis due to pollen, unspecified seasonality  Zyrtec for allergies is refilled.  - cetirizine (ZYRTEC) 10 MG tablet; Take 1 tablet (10 mg) by mouth daily    Seasonal depression (H)  Seasonal depression on Zoloft we will continue this.  - sertraline (ZOLOFT) 100 MG tablet; Take 1 tablet (100 mg) by mouth daily    Patient is up-to-date with her COVID and flu and pneumonia shots.  She will get her second shingles shot today.    No follow-ups on file.    Luis Alston MD  [facility]    Shravan Hawkins is a 66 year old who presents for the following health issues     HPI     Hyperlipidemia Follow-Up      Are you regularly taking any medication or supplement to lower your cholesterol?   Yes- crestor    Are you having muscle aches or other side effects that you think  "could be caused by your cholesterol lowering medication?  Yes- pain in feet      How many servings of fruits and vegetables do you eat daily?  2-3    On average, how many sweetened beverages do you drink each day (Examples: soda, juice, sweet tea, etc.  Do NOT count diet or artificially sweetened beverages)?   0    How many days per week do you exercise enough to make your heart beat faster? None currently    How many minutes a day do you exercise enough to make your heart beat faster? None currently    How many days per week do you miss taking your medication? 0    Had first shingles shot in October and arm got red for 4 days.    Now using [facility].    Pain in the left knee comes and goes.  Was working at [employer] this fall, on her feet more    Crestor and stopped it for ketodiet for a while. hgba1c was good at 5.8 in September. Weight is down some. Had leg pain and brayden horse, felt better off it.   Went back on it after September when cholesterol was up to 375, LDL of 263.  Then feet started to bother her again now.     Started before with Lipitor and that caused back pains.         Review of Systems         Objective    /80   Pulse 95   Temp 97.8  F (36.6  C) (Temporal)   Resp 16   Ht 1.588 m (5' 2.5\")   Wt 58.7 kg (129 lb 4.8 oz)   SpO2 98%   BMI 23.27 kg/m    Body mass index is 23.27 kg/m .  Physical Exam   No acute distress  Heart is regular lungs are clear  Abdomen soft benign without hepatomegaly  Extremities without edema.              "

## 2022-01-30 ENCOUNTER — HEALTH MAINTENANCE LETTER (OUTPATIENT)
Age: 67
End: 2022-01-30

## 2022-02-09 ENCOUNTER — LAB (OUTPATIENT)
Dept: LAB | Facility: CLINIC | Age: 67
End: 2022-02-09
Payer: MEDICARE

## 2022-02-09 DIAGNOSIS — J30.1 ALLERGIC RHINITIS DUE TO POLLEN, UNSPECIFIED SEASONALITY: ICD-10-CM

## 2022-02-09 DIAGNOSIS — E78.5 HYPERLIPIDEMIA LDL GOAL <100: ICD-10-CM

## 2022-02-09 DIAGNOSIS — F33.8 SEASONAL DEPRESSION (H): ICD-10-CM

## 2022-02-09 DIAGNOSIS — E11.9 TYPE 2 DIABETES MELLITUS WITHOUT COMPLICATION, WITHOUT LONG-TERM CURRENT USE OF INSULIN (H): ICD-10-CM

## 2022-02-09 DIAGNOSIS — I10 ESSENTIAL HYPERTENSION: ICD-10-CM

## 2022-02-09 DIAGNOSIS — M79.10 MYALGIA: ICD-10-CM

## 2022-02-09 LAB
ALBUMIN SERPL-MCNC: 4.1 G/DL (ref 3.4–5)
ALP SERPL-CCNC: 97 U/L (ref 40–150)
ALT SERPL W P-5'-P-CCNC: 19 U/L (ref 0–50)
AST SERPL W P-5'-P-CCNC: 7 U/L (ref 0–45)
BILIRUB DIRECT SERPL-MCNC: 0.1 MG/DL (ref 0–0.2)
BILIRUB SERPL-MCNC: 0.6 MG/DL (ref 0.2–1.3)
CHOLEST SERPL-MCNC: 255 MG/DL
CK SERPL-CCNC: 55 U/L (ref 30–225)
FASTING STATUS PATIENT QL REPORTED: YES
HBA1C MFR BLD: 5.9 % (ref 0–5.6)
HDLC SERPL-MCNC: 73 MG/DL
LDLC SERPL CALC-MCNC: 146 MG/DL
NONHDLC SERPL-MCNC: 182 MG/DL
PROT SERPL-MCNC: 8.3 G/DL (ref 6.8–8.8)
TRIGL SERPL-MCNC: 180 MG/DL

## 2022-02-09 PROCEDURE — 82550 ASSAY OF CK (CPK): CPT

## 2022-02-09 PROCEDURE — 80076 HEPATIC FUNCTION PANEL: CPT

## 2022-02-09 PROCEDURE — 36415 COLL VENOUS BLD VENIPUNCTURE: CPT

## 2022-02-09 PROCEDURE — 80061 LIPID PANEL: CPT

## 2022-02-09 PROCEDURE — 83036 HEMOGLOBIN GLYCOSYLATED A1C: CPT

## 2022-02-09 RX ORDER — SERTRALINE HYDROCHLORIDE 100 MG/1
100 TABLET, FILM COATED ORAL DAILY
Qty: 90 TABLET | Refills: 3 | Status: CANCELLED | OUTPATIENT
Start: 2022-02-09

## 2022-02-09 RX ORDER — LOSARTAN POTASSIUM AND HYDROCHLOROTHIAZIDE 25; 100 MG/1; MG/1
1 TABLET ORAL DAILY
Qty: 90 TABLET | Refills: 3 | Status: SHIPPED | OUTPATIENT
Start: 2022-02-09 | End: 2022-02-15

## 2022-02-09 RX ORDER — CETIRIZINE HYDROCHLORIDE 10 MG/1
10 TABLET ORAL DAILY
Qty: 90 TABLET | Refills: 3 | Status: SHIPPED | OUTPATIENT
Start: 2022-02-09 | End: 2022-02-15

## 2022-02-09 RX ORDER — SERTRALINE HYDROCHLORIDE 100 MG/1
100 TABLET, FILM COATED ORAL DAILY
Qty: 90 TABLET | Refills: 3 | Status: SHIPPED | OUTPATIENT
Start: 2022-02-09 | End: 2022-02-15

## 2022-02-09 RX ORDER — ROSUVASTATIN CALCIUM 20 MG/1
20 TABLET, COATED ORAL EVERY OTHER DAY
Qty: 90 TABLET | Refills: 3 | Status: SHIPPED | OUTPATIENT
Start: 2022-02-09 | End: 2022-02-15

## 2022-02-09 RX ORDER — LOSARTAN POTASSIUM AND HYDROCHLOROTHIAZIDE 25; 100 MG/1; MG/1
1 TABLET ORAL DAILY
Qty: 90 TABLET | Refills: 3 | Status: CANCELLED | OUTPATIENT
Start: 2022-02-09

## 2022-02-09 NOTE — TELEPHONE ENCOUNTER
Zoloft, Hyzaar and Zyrtec:  RN sent refills signed by PCP to mail order pharmacy again.    Crestor:  Routing refill request to provider for review/approval because:  Medication is reported/historical

## 2022-02-12 ENCOUNTER — MYC MEDICAL ADVICE (OUTPATIENT)
Dept: INTERNAL MEDICINE | Facility: CLINIC | Age: 67
End: 2022-02-12
Payer: MEDICARE

## 2022-02-12 DIAGNOSIS — J30.1 ALLERGIC RHINITIS DUE TO POLLEN, UNSPECIFIED SEASONALITY: ICD-10-CM

## 2022-02-12 DIAGNOSIS — F33.8 SEASONAL DEPRESSION (H): ICD-10-CM

## 2022-02-12 DIAGNOSIS — E78.5 HYPERLIPIDEMIA LDL GOAL <100: ICD-10-CM

## 2022-02-12 DIAGNOSIS — E11.9 TYPE 2 DIABETES MELLITUS WITHOUT COMPLICATION, WITHOUT LONG-TERM CURRENT USE OF INSULIN (H): ICD-10-CM

## 2022-02-12 DIAGNOSIS — I10 ESSENTIAL HYPERTENSION: ICD-10-CM

## 2022-02-15 RX ORDER — SERTRALINE HYDROCHLORIDE 100 MG/1
100 TABLET, FILM COATED ORAL DAILY
Qty: 90 TABLET | Refills: 3 | Status: SHIPPED | OUTPATIENT
Start: 2022-02-15 | End: 2023-01-23

## 2022-02-15 RX ORDER — CETIRIZINE HYDROCHLORIDE 10 MG/1
10 TABLET ORAL DAILY
Qty: 90 TABLET | Refills: 3 | Status: SHIPPED | OUTPATIENT
Start: 2022-02-15 | End: 2022-05-20

## 2022-02-15 RX ORDER — LOSARTAN POTASSIUM AND HYDROCHLOROTHIAZIDE 25; 100 MG/1; MG/1
1 TABLET ORAL DAILY
Qty: 90 TABLET | Refills: 3 | Status: SHIPPED | OUTPATIENT
Start: 2022-02-15 | End: 2023-01-23

## 2022-02-15 RX ORDER — ROSUVASTATIN CALCIUM 20 MG/1
20 TABLET, COATED ORAL EVERY OTHER DAY
Qty: 90 TABLET | Refills: 3 | Status: SHIPPED | OUTPATIENT
Start: 2022-02-15 | End: 2023-01-23

## 2022-02-15 NOTE — TELEPHONE ENCOUNTER
Zoloft  Crestor  Hyzaar  Zyrtec    Resending prescriptions per pharmacy change requested.  Deana Linares RN

## 2022-02-23 ENCOUNTER — TELEPHONE (OUTPATIENT)
Dept: INTERNAL MEDICINE | Facility: CLINIC | Age: 67
End: 2022-02-23
Payer: MEDICARE

## 2022-02-23 NOTE — TELEPHONE ENCOUNTER
Patient is calling and verbalized that she is having difficulty getting her medication from the mail order pharmacy she recently changed to in January of 2022.  She stated she had prescriptions sent to [facility] in January 2022 and had never received her medications, so she changed the pharmacy on 2/15/2022 to the [facility] near her.  She stated that [facility] had no co-pay and [facility] has co-pays and she is not sure what to do.  She stated [facility] informed her that they do not carry Hyzaar so she would for sure need to keep that prescription with [facility].  Advised patient to call [facility] and find the co-pay on each prescription per the amount of refill (if filling prescription was less expensive for 30 days versus 90 day supply).  Advised patient to  medication [facility] on the decision she would make for the expenses and send her other prescriptions that [facility] DID have back to their delivery service if finding less expensive to continue with mail order delivery on all other medications excluding Hyzaar.    Patient stated understanding.    Deana Linares RN

## 2022-03-02 ENCOUNTER — E-VISIT (OUTPATIENT)
Dept: URGENT CARE | Facility: URGENT CARE | Age: 67
End: 2022-03-02
Payer: MEDICARE

## 2022-03-02 DIAGNOSIS — J01.90 ACUTE SINUSITIS WITH SYMPTOMS > 10 DAYS: Primary | ICD-10-CM

## 2022-03-02 PROCEDURE — 99421 OL DIG E/M SVC 5-10 MIN: CPT | Performed by: PHYSICIAN ASSISTANT

## 2022-03-03 NOTE — PATIENT INSTRUCTIONS
You may want to try a nasal lavage (also known as nasal irrigation). You can find over-the-counter products, such as Neti-Pot, at retail locations or make your own at home. Instructions for homemade nasal lavage and more information on the process are available online at http://www.aafp.org/afp/2009/1115/p1121.html.      Sinusitis (Antibiotic Treatment)    The sinuses are air-filled spaces within the bones of the face. They connect to the inside of the nose. Sinusitis is an inflammation of the tissue that lines the sinuses. Sinusitis can occur during a cold. It can also happen due to allergies to pollens and other particles in the air. Sinusitis can cause symptoms of sinus congestion and a feeling of fullness. A sinus infection causes fever, headache, and facial pain. There is often green or yellow fluid draining from the nose or into the back of the throat (post-nasal drip). You have been given antibiotics to treat this condition.   Home care    Take the full course of antibiotics as instructed. Don't stop taking them, even when you feel better.    Drink plenty of water, hot tea, and other liquids as directed by the healthcare provider. This may help thin nasal mucus. It also may help your sinuses drain fluids.    Heat may help soothe painful areas of your face. Use a towel soaked in hot water. Or,  the shower and direct the warm spray onto your face. Using a vaporizer along with a menthol rub at night may also help soothe symptoms.     An expectorant with guaifenesin may help thin nasal mucus and help your sinuses drain fluids. Talk with your provider or pharmacists before taking an over-the-counter (OTC) medicine if you have any questions about it or its side effects..    You can use an OTC decongestant, unless a similar medicine was prescribed to you. Nasal sprays work the fastest. Use one that contains phenylephrine or oxymetazoline. First blow your nose gently. Then use the spray. Don't use these  medicines more often than directed on the label. If you do, your symptoms may get worse. You may also take pills that contain pseudoephedrine. Don t use products that combine multiple medicines. This is because side effects may be increased. Read labels. You can also ask the pharmacist for help. (People with high blood pressure should not use decongestants. They can raise blood pressure.) Talk with your provider or pharmacist if you have any questions about the medicine..    OTC antihistamines may help if allergies contributed to your sinusitis. Talk with your provider or pharmacist if you have any questions about the medicine..    Don't use nasal rinses or irrigation during an acute sinus infection, unless your healthcare provider tells you to. Rinsing may spread the infection to other areas in your sinuses.    Use acetaminophen or ibuprofen to control pain, unless another pain medicine was prescribed to you. If you have chronic liver or kidney disease or ever had a stomach ulcer, talk with your healthcare provider before using these medicines. Never give aspirin to anyone under age 18 who is ill with a fever. It may cause severe liver damage.    Don't smoke. This can make symptoms worse.    Follow-up care  Follow up with your healthcare provider, or as advised.   When to seek medical advice  Call your healthcare provider if any of these occur:     Facial pain or headache that gets worse    Stiff neck    Unusual drowsiness or confusion    Swelling of your forehead or eyelids    Symptoms don't go away in 10 days    Vision problems, such as blurred or double vision    Fever of 100.4 F (38 C) or higher, or as directed by your healthcare provider  Call 911  Call 911 if any of these occur:     Seizure    Trouble breathing    Feeling dizzy or faint    Fingernails, skin or lips look blue, purple , or gray  Prevention  Here are steps you can take to help prevent an infection:     Keep good hand washing habits.    Don t  have close contact with people who have sore throats, colds, or other upper respiratory infections.    Don t smoke, and stay away from secondhand smoke.    Stay up to date with of your vaccines.  [employer] last reviewed this educational content on 12/1/2019 2000-2021 [employer]. All rights reserved. This information is not intended as a substitute for professional medical care. Always follow your healthcare professional's instructions.        Dear Shruthi Rivas    After reviewing your responses, I've been able to diagnose you with?a sinus infection caused by bacteria.?     Based on your responses and diagnosis, I have prescribed Augmentin to treat your symptoms. I have sent this to your pharmacy.?     It is also important to stay well hydrated, get lots of rest and take over-the-counter decongestants,?tylenol?or ibuprofen if you?are able to?take those medications per your primary care provider to help relieve discomfort.?     It is important that you take?all of?your prescribed medication even if your symptoms are improving after a few doses.? Taking?all of?your medicine helps prevent the symptoms from returning.?     If your symptoms worsen, you develop severe headache, vomiting, high fever (>102), or are not improving in 7 days, please contact your primary care provider for an appointment or visit any of our convenient Walk-in Care or Urgent Care Centers to be seen which can be found on our website?here.?     Thanks again for choosing?us?as your health care partner,?   ?  Joan Luther PA-C?

## 2022-05-10 ENCOUNTER — NURSE TRIAGE (OUTPATIENT)
Dept: NURSING | Facility: CLINIC | Age: 67
End: 2022-05-10
Payer: MEDICARE

## 2022-05-10 NOTE — TELEPHONE ENCOUNTER
Patient tested positive for Covid with an at-home Covid test, but her employer is requesting a PCR test. Discussed symptoms and treatment options, but patient is outside the window for treatment since symptoms started last week.    Transferred patient to scheduling for Covid testing.    Kristen Sanchez RN  05/10/22 3:46 PM  [facility] Nurse Advisor        COVID-19 testing at [facility] is by appointment only. You'll need to schedule a time to get tested. If you have symptoms (signs) of COVID, please log in to [employer] to complete the symptom .     If you don't have COVID symptoms and want to get tested, you should also log in to [employer] to complete the symptom .   This includes people who:    have had close contact with a COVID-positive person    want to be tested before or after travel    have taken part in high-risk activities    have a school testing mandate, or     were told to get tested by their care team or the health department.    After the symptom  has been completed, you will be able to schedule your COVID test on [employer]. To learn more about our testing locations or for other details, please visit our COVID-19 Resource Hub.    [employer] is also the fastest way to get your test results. You'll get your results in [employer] within 3 days. If you don't use [employer], you'll get your results in the mail in 7 to 10 days. If your test is positive and you don't view your result in [employer] within 1 business day, you'll get a phone call with your result. A positive result means that you have COVID-19.    If you have an upcoming procedure at [facility], you'll need to be tested for COVID. The test needs to happen 2 to 4 days before your procedure. If you have an upcoming procedure, we will contact you to schedule a COVID test.    If you don't have a [employer] account, please call [phone] to complete the symptom  over the phone.     You can also find community testing  sites in [place] at [place]get[place]tested[place]testing-locations. If you live in [place], please visit www.[place]hs.[place].gov/covid-19/community-testing.htm.    Additional Information    Negative: SEVERE difficulty breathing (e.g., struggling for each breath, speaks in single words)    Negative: Difficult to awaken or acting confused (e.g., disoriented, slurred speech)    Negative: Bluish (or gray) lips or face now    Negative: Shock suspected (e.g., cold/pale/clammy skin, too weak to stand, low BP, rapid pulse)    Negative: Sounds like a life-threatening emergency to the triager    Negative: [1] Diagnosed or suspected COVID-19 AND [2] symptoms lasting 3 or more weeks    Negative: [1] COVID-19 exposure AND [2] no symptoms    Negative: COVID-19 vaccine reaction suspected (e.g., fever, headache, muscle aches) occurring 1 to 3 days after getting vaccine    Negative: COVID-19 vaccine, questions about    Negative: [1] Lives with someone known to have influenza (flu test positive) AND [2] flu-like symptoms (e.g., cough, runny nose, sore throat, SOB; with or without fever)    Negative: [1] Adult with possible COVID-19 symptoms AND [2] triager concerned about severity of symptoms or other causes    Negative: COVID-19 and breastfeeding, questions about    Negative: SEVERE or constant chest pain or pressure  (Exception: Mild central chest pain, present only when coughing.)    Negative: MODERATE difficulty breathing (e.g., speaks in phrases, SOB even at rest, pulse 100-120)    Negative: Headache and stiff neck (can't touch chin to chest)    Negative: Oxygen level (e.g., pulse oximetry) 90 percent or lower    Negative: Chest pain or pressure    Negative: Patient sounds very sick or weak to the triager    Negative: MILD difficulty breathing (e.g., minimal/no SOB at rest, SOB with walking, pulse <100)    Negative: Fever > 103 F (39.4 C)    Negative: [1] Fever > 101 F (38.3 C) AND [2] over 60 years of age    Protocols used:  CORONAVIRUS (COVID-19) DIAGNOSED OR [id number] 1.18.2022

## 2022-05-12 ENCOUNTER — LAB (OUTPATIENT)
Dept: FAMILY MEDICINE | Facility: CLINIC | Age: 67
End: 2022-05-12
Payer: MEDICARE

## 2022-05-12 DIAGNOSIS — Z20.822 SUSPECTED COVID-19 VIRUS INFECTION: ICD-10-CM

## 2022-05-12 LAB — SARS-COV-2 RNA RESP QL NAA+PROBE: POSITIVE

## 2022-05-12 PROCEDURE — U0003 INFECTIOUS AGENT DETECTION BY NUCLEIC ACID (DNA OR RNA); SEVERE ACUTE RESPIRATORY SYNDROME CORONAVIRUS 2 (SARS-COV-2) (CORONAVIRUS DISEASE [COVID-19]), AMPLIFIED PROBE TECHNIQUE, MAKING USE OF HIGH THROUGHPUT TECHNOLOGIES AS DESCRIBED BY CMS-2020-01-R: HCPCS

## 2022-05-12 PROCEDURE — U0005 INFEC AGEN DETEC AMPLI PROBE: HCPCS

## 2022-05-13 ENCOUNTER — TELEPHONE (OUTPATIENT)
Dept: NURSING | Facility: CLINIC | Age: 67
End: 2022-05-13
Payer: MEDICARE

## 2022-05-13 NOTE — TELEPHONE ENCOUNTER
Patient classified as COVID treatment eligible by Epic high risk algorithm:  Yes    Coronavirus (COVID-19) Notification    Reason for call  Notify of POSITIVE COVID-19 lab result, assess symptoms,  review [facility] recommendations    Lab Result   Lab test for 2019-nCoV rRt-PCR or SARS-COV-2 PCR  Oropharyngeal AND/OR nasopharyngeal swabs were POSITIVE for 2019-nCoV RNA [OR] SARS-COV-2 RNA (COVID-19) RNA     We have been unable to reach patient by phone at this time to notify of their Positive COVID-19 result.    Left voicemail message requesting a call back to [phone] [facility] for results.        A Positive COVID-19 letter will be sent via [employer] or the mail. (Exception, no letters sent to Presurgerical/Preprocedure Patients)    Trinh Morton

## 2022-05-13 NOTE — TELEPHONE ENCOUNTER
"Coronavirus (COVID-19) Notification    Caller Name (Patient, parent, daughter/son, grandparent, etc)  Patient     Reason for call  Notify of Positive Coronavirus (COVID-19) lab results, assess symptoms,  review [facility] [place] recommendations    Lab Result    Lab test:  2019-nCoV rRt-PCR or SARS-CoV-2 PCR    Oropharyngeal AND/OR nasopharyngeal swabs is POSITIVE for 2019-nCoV RNA/SARS-COV-2 PCR (COVID-19 virus)      Gather patient reported symptoms   Assessment   Current Symptoms at time of phone call, reported by patient: (if no symptoms, document: No symptoms] Cough, nose runny /plugged congestion, trouble thinking \"slow concentration\" \"confused\" worse on Tuesday.  Feels like she is improving.     Date of symptom(s) onset (if applicable) 6-7 days Sunday positive home test     If at time of call, Patients symptoms have worsened, the Patient should contact 911 or have someone drive them to Emergency Dept promptly:      If Patient calling 911, inform 911 personal that you have tested positive for the Coronavirus (COVID-19).  Place mask on and await 911 to arrive.    If Emergency Dept, If possible, please have another adult drive you to the Emergency Dept but you need to wear mask when in contact with other people.      Treatment Options:   Patient classified as COVID treatment eligible by Epic high risk algorithm: Yes  Is the patient symptomatic at the time of result notification? Yes. Was the onset of symptoms within the last 5 days? No. Was the onset of symptoms within the last 7 days? Yes  Is the patient interested in a visit with a provider to discuss treatment options?: No.  Reason patient declined:  Other: I am still paying for my shingles vaccine that Medicare did not cover.       Review information with Patient    Your result was positive. This means you have COVID-19 (coronavirus).    How can I protect others?    These guidelines are for isolating before returning to work, school or .    If you DO " have symptoms    Stay home and away from others     For at least 5 days after your symptoms started, AND    You are fever free for 24 hours (with no medicine that reduces fever), AND    Your other symptoms are better    Wear a mask for 10 full days anytime you are around others    If you DON'T have symptoms    Stay home and away from others for at least 5 days after your positive test    Wear a mask for 10 full days anytime you are around others    There may be different guidelines for healthcare facilities.  Please check with the specific sites before arriving.    If you have been told by a doctor that you were severely ill with COVID-19 or are immunocompromised, you should isolate for at least 10 days.    You should not go back to work until you meet the guidelines above for ending your home isolation. You don't need to be retested for COVID-19 before going back to work--studies show that you won't spread the virus if it's been at least 10 days since your symptoms started (or 20 days, if you have a weak immune system).    Employers, schools, and daycares: This is an official notice for this person's medical guidelines for returning in-person.  They must meet the above guidelines before going back to work, school or  in person.    You will receive a positive COVID-19 letter via [employer] or the mail soon with additional self-care information (exception, no letters will be sent to presurgical/preprocedure patients).    Would you like me to review some of that information with you now?  Yes    How can I take care of myself?      Get lots of rest. Drink extra fluids (unless a doctor has told you not to).      Take Tylenol (acetaminophen) for fever or pain. If you have liver or kidney problems, ask your family doctor if it's okay to take Tylenol.     Take either:     650 mg (two 325 mg pills) every 4 to 6 hours, or     1,000 mg (two 500 mg pills) every 8 hours as needed.     Note: Do not take more than 3,000 mg in  one day. Acetaminophen is found in many medicines (both prescribed and over-the-counter medicines). Read all labels to be sure you don't take too much.    For children, check the Tylenol bottle for the right dose (based on their age or weight).      If you have other health problems (like cancer, heart failure, an organ transplant or severe kidney disease): Call your specialty clinic if you don't feel better in the next 2 days.      Know when to call 911: Emergency warning signs include:    Trouble breathing or shortness of breath    Pain or pressure in the chest that doesn't go away    Feeling confused like you haven't felt before, or not being able to wake up    Bluish-colored lips or face        If you were tested for an upcoming procedure, please contact your provider for next steps.    Fang Quintero LPN

## 2022-05-20 ENCOUNTER — OFFICE VISIT (OUTPATIENT)
Dept: FAMILY MEDICINE | Facility: CLINIC | Age: 67
End: 2022-05-20
Payer: MEDICARE

## 2022-05-20 DIAGNOSIS — U07.1 INFECTION DUE TO 2019 NOVEL CORONAVIRUS: ICD-10-CM

## 2022-05-20 DIAGNOSIS — Z12.11 SCREEN FOR COLON CANCER: Primary | ICD-10-CM

## 2022-05-20 PROCEDURE — 99213 OFFICE O/P EST LOW 20 MIN: CPT | Performed by: FAMILY MEDICINE

## 2022-05-20 RX ORDER — BENZONATATE 200 MG/1
200 CAPSULE ORAL 3 TIMES DAILY PRN
Qty: 30 CAPSULE | Refills: 1 | Status: SHIPPED | OUTPATIENT
Start: 2022-05-20 | End: 2022-06-10

## 2022-05-20 ASSESSMENT — ENCOUNTER SYMPTOMS: COUGH: 1

## 2022-05-20 NOTE — PROGRESS NOTES
SUBJECTIVE:   Shruthi Rivas is a 66 year old female presenting with a chief complaint of a cough.    In April she was treated with antibiotic(s) for a sinus infection.    On the 25th of April she started coughing   She was treated with prednisone for 5 day(s)  in late April     On Mothers day she did a home covid test that was positive   She did a PCR test through [facility] on May 12th that was positive      She has been coughing frequently but it is getting better   On May 12th she was seen in the [facility] and was checked out and   No treatment was given     Her main concern is that she is still coughing frequently   Her daughter s a nurse and was worried about pnemonia   She has coughing lots in the am   She gets little bubbles of mucous that is mostly clear.   She had some yellow mucous previously.  Her other significant(ly) concern is fatigue.   She is not sure how she will get through a day(s) of work with he way she feels.        There is no problem list on file for this patient.    Current Outpatient Medications   Medication Sig Dispense Refill     losartan-hydrochlorothiazide (HYZAAR) 100-25 MG tablet Take 1 tablet by mouth daily 90 tablet 3     multivitamin w/minerals (CENTRUM ADULTS) tablet Take 1 tablet by mouth daily       rosuvastatin (CRESTOR) 20 MG tablet Take 1 tablet (20 mg) by mouth every other day 90 tablet 3     sertraline (ZOLOFT) 100 MG tablet Take 1 tablet (100 mg) by mouth daily 90 tablet 3     Vitamin D, Cholecalciferol, 25 MCG (1000 UT) TABS Take 1,000 Units by mouth daily       Social History     Tobacco Use     Smoking status: Never Smoker     Smokeless tobacco: Never Used   Substance Use Topics     Alcohol use: Not Currently     Comment: Once a year maybe at the most             Lab on 05/12/2022   Component Date Value Ref Range Status     SARS CoV2 PCR 05/12/2022 Positive (A) Negative Final    POSITIVE: SARS-CoV-2 (COVID-19) RNA detected, presumed positive.            OBJECTIVE  :There were no vitals taken for this visit.   GENERAL APPEARANCE: healthy, alert and no distress  EYES: EOMI,  PERRL, conjunctiva clear  HENT: ear canals and TM's normal.  Nose and mouth without ulcers, erythema or lesions  NECK: supple, nontender, no lymphadenopathy  RESP: lungs clear to auscultation - no rales, rhonchi or wheezes  CV: regular rates and rhythm, normal S1 S2, no murmur noted  ABDOMEN:  soft, nontender, no HSM or masses and bowel sounds normal  NEURO: Normal strength and tone, sensory exam grossly normal,  normal speech and mentation  SKIN: no suspicious lesions or rashes    ASSESSMENT:  COVID respiratory infection  No signs if pneumonia    PLAN:  The patient was reassured that there is no evidence of a bacterial etiology., I recommended that the patient get lots of fluids and rest., A prescription for Tessalon Pearles was given and a note for work was written.       During the visit I did wear a mask, shield, gown and gloves the entire time I was in the exam room with the patient.    During the visit the patient did wear a mask while I was in the exam room with her  except when I was examining her nose and throat or doing the nasopharyngeal swab.

## 2022-05-20 NOTE — LETTER
[facility]  [address] [facility] [address]  Phone: [phone]    May 20, 2022        Shruthi Rivas  [address] [facility] [address]          To whom it may concern:    RE: Shruthi Rivas    Patient was seen and treated today at our clinic.  She was diagnosed with COVID via a PCR test on 5-12-22. Due to her slow recovery she will need time to recover and can not return to work until after May 27th, 2022.    Please contact me for questions or concerns.      Sincerely,        aRy Go MD

## 2022-05-22 ENCOUNTER — HEALTH MAINTENANCE LETTER (OUTPATIENT)
Age: 67
End: 2022-05-22

## 2022-06-10 ENCOUNTER — OFFICE VISIT (OUTPATIENT)
Dept: INTERNAL MEDICINE | Facility: CLINIC | Age: 67
End: 2022-06-10
Payer: MEDICARE

## 2022-06-10 VITALS
BODY MASS INDEX: 25.47 KG/M2 | HEART RATE: 103 BPM | OXYGEN SATURATION: 96 % | SYSTOLIC BLOOD PRESSURE: 124 MMHG | WEIGHT: 141.5 LBS | TEMPERATURE: 97 F | DIASTOLIC BLOOD PRESSURE: 70 MMHG

## 2022-06-10 DIAGNOSIS — E11.9 TYPE 2 DIABETES MELLITUS WITHOUT COMPLICATION, WITHOUT LONG-TERM CURRENT USE OF INSULIN (H): ICD-10-CM

## 2022-06-10 DIAGNOSIS — Z11.59 NEED FOR HEPATITIS C SCREENING TEST: ICD-10-CM

## 2022-06-10 DIAGNOSIS — I10 ESSENTIAL HYPERTENSION: Primary | ICD-10-CM

## 2022-06-10 DIAGNOSIS — U07.1 INFECTION DUE TO 2019 NOVEL CORONAVIRUS: ICD-10-CM

## 2022-06-10 PROCEDURE — 99214 OFFICE O/P EST MOD 30 MIN: CPT | Performed by: INTERNAL MEDICINE

## 2022-06-10 RX ORDER — ALBUTEROL SULFATE 90 UG/1
AEROSOL, METERED RESPIRATORY (INHALATION)
COMMUNITY
Start: 2022-04-30 | End: 2023-08-22

## 2022-06-10 ASSESSMENT — PAIN SCALES - GENERAL: PAINLEVEL: NO PAIN (0)

## 2022-06-10 NOTE — PROGRESS NOTES
Assessment & Plan     Essential hypertension  Blood pressure, is doing well she needs to recheck her basic panel and microalbumin she does not want to do this today but will consider in the future orders are placed.  Continue her medications.  - BASIC METABOLIC PANEL; Future  - Albumin Random Urine Quantitative with Creat Ratio; Future    Type 2 diabetes mellitus without complication, without long-term current use of insulin (H)  Diabetes is diet controlled she got her A1c down to 5.9 we will recheck this.  - HEMOGLOBIN A1C; Future    Infection due to 2019 novel coronavirus  Infection due to COVID-19, the patient is better she had a cough for quite a while use some albuterol and Tessalon Perles but now is better her lung exam is reassuring and normal.    Need for hepatitis C screening test  She is due for hepatitis C screening she will consider this in the future may have it done at a future job.  - Hepatitis C Screen Reflex to HCV RNA Quant and Genotype; Future    Form is filled out for COVID-19 and work restrictions.  The patient was off work from May 10 until May 27.             No follow-ups on file.    Luis Alston MD  [employer]    Shravan Hawkins is a 66 year old who presents for the following health issues     History of Present Illness       Hypertension: She presents for follow up of hypertension.  She does not check blood pressure  regularly outside of the clinic. Outpatient blood pressures have not been over 140/90. She follows a low salt diet.       Had Covid in May, had a cough, has been evaluated for the cough but now doing better. No treatment as it may have been over 5 days.  Cough more at night, still there.      Back to walking for exercise. Cough slowing getting better.     Itching arm from weeds.        Past Medical History:   Diagnosis Date     Cancer (H) Jan 2020    Basal Cell Carcinoma     Depressive disorder      Essential hypertension      History of blood  transfusion 2007    needed from hysterectomy surgery     Type 2 diabetes mellitus (H)      Current Outpatient Medications   Medication     albuterol (PROAIR HFA/PROVENTIL HFA/VENTOLIN HFA) 108 (90 Base) MCG/ACT inhaler     losartan-hydrochlorothiazide (HYZAAR) 100-25 MG tablet     multivitamin w/minerals (CENTRUM ADULTS) tablet     rosuvastatin (CRESTOR) 20 MG tablet     sertraline (ZOLOFT) 100 MG tablet     Vitamin D, Cholecalciferol, 25 MCG (1000 UT) TABS     benzonatate (TESSALON) 200 MG capsule     No current facility-administered medications for this visit.     Social History     Tobacco Use     Smoking status: Never Smoker     Smokeless tobacco: Never Used   Vaping Use     Vaping Use: Never used   Substance Use Topics     Alcohol use: Not Currently     Comment: Once a year maybe at the most     Drug use: Never     Review of Systems         Objective    BP (!) 140/70   Pulse 103   Temp 97  F (36.1  C) (Temporal)   Wt 64.2 kg (141 lb 8 oz)   SpO2 96%   BMI 25.47 kg/m    Body mass index is 25.47 kg/m .  Physical Exam   No acute distress  Heart is regular rate and rhythm  Lungs are clear  Extremities without edema  Recheck of blood pressure is 124/70.

## 2022-09-24 ENCOUNTER — HEALTH MAINTENANCE LETTER (OUTPATIENT)
Age: 67
End: 2022-09-24

## 2023-01-21 ASSESSMENT — ENCOUNTER SYMPTOMS
EYE PAIN: 0
NAUSEA: 0
PALPITATIONS: 0
ARTHRALGIAS: 0
HEARTBURN: 0
HEMATURIA: 0
COUGH: 0
CONSTIPATION: 0
CHILLS: 0
HEMATOCHEZIA: 0
PARESTHESIAS: 0
SORE THROAT: 0
DIZZINESS: 0
DIARRHEA: 0
FEVER: 0
BREAST MASS: 0
DYSURIA: 0
JOINT SWELLING: 0
SHORTNESS OF BREATH: 0
MYALGIAS: 1
ABDOMINAL PAIN: 0
FREQUENCY: 0
WEAKNESS: 0
NERVOUS/ANXIOUS: 0
HEADACHES: 0

## 2023-01-21 ASSESSMENT — ACTIVITIES OF DAILY LIVING (ADL): CURRENT_FUNCTION: NO ASSISTANCE NEEDED

## 2023-01-23 ENCOUNTER — OFFICE VISIT (OUTPATIENT)
Dept: INTERNAL MEDICINE | Facility: CLINIC | Age: 68
End: 2023-01-23
Payer: MEDICARE

## 2023-01-23 VITALS
OXYGEN SATURATION: 100 % | WEIGHT: 142 LBS | DIASTOLIC BLOOD PRESSURE: 66 MMHG | HEART RATE: 70 BPM | SYSTOLIC BLOOD PRESSURE: 120 MMHG | BODY MASS INDEX: 24.24 KG/M2 | HEIGHT: 64 IN | RESPIRATION RATE: 10 BRPM | TEMPERATURE: 97.9 F

## 2023-01-23 DIAGNOSIS — E78.5 HYPERLIPIDEMIA LDL GOAL <100: ICD-10-CM

## 2023-01-23 DIAGNOSIS — Z00.00 MEDICARE ANNUAL WELLNESS VISIT, SUBSEQUENT: Primary | ICD-10-CM

## 2023-01-23 DIAGNOSIS — I10 ESSENTIAL HYPERTENSION: ICD-10-CM

## 2023-01-23 DIAGNOSIS — E11.9 TYPE 2 DIABETES MELLITUS WITHOUT COMPLICATION, WITHOUT LONG-TERM CURRENT USE OF INSULIN (H): ICD-10-CM

## 2023-01-23 DIAGNOSIS — F33.8 SEASONAL DEPRESSION (H): ICD-10-CM

## 2023-01-23 LAB
ALBUMIN SERPL BCG-MCNC: 4.9 G/DL (ref 3.5–5.2)
ALP SERPL-CCNC: 114 U/L (ref 35–104)
ALT SERPL W P-5'-P-CCNC: 18 U/L (ref 10–35)
ANION GAP SERPL CALCULATED.3IONS-SCNC: 14 MMOL/L (ref 7–15)
AST SERPL W P-5'-P-CCNC: 16 U/L (ref 10–35)
BILIRUB SERPL-MCNC: 0.5 MG/DL
BUN SERPL-MCNC: 18.6 MG/DL (ref 8–23)
CALCIUM SERPL-MCNC: 9.9 MG/DL (ref 8.8–10.2)
CHLORIDE SERPL-SCNC: 100 MMOL/L (ref 98–107)
CHOLEST SERPL-MCNC: 429 MG/DL
CREAT SERPL-MCNC: 0.73 MG/DL (ref 0.51–0.95)
CREAT UR-MCNC: 120.8 MG/DL
DEPRECATED HCO3 PLAS-SCNC: 27 MMOL/L (ref 22–29)
GFR SERPL CREATININE-BSD FRML MDRD: 89 ML/MIN/1.73M2
GLUCOSE SERPL-MCNC: 99 MG/DL (ref 70–99)
HBA1C MFR BLD: 5.9 %
HDLC SERPL-MCNC: 67 MG/DL
LDLC SERPL CALC-MCNC: 303 MG/DL
MICROALBUMIN UR-MCNC: <12 MG/L
MICROALBUMIN/CREAT UR: NORMAL MG/G{CREAT}
NONHDLC SERPL-MCNC: 362 MG/DL
POTASSIUM SERPL-SCNC: 4.1 MMOL/L (ref 3.4–5.3)
PROT SERPL-MCNC: 8.3 G/DL (ref 6.4–8.3)
SODIUM SERPL-SCNC: 141 MMOL/L (ref 136–145)
TRIGL SERPL-MCNC: 293 MG/DL
VIT B12 SERPL-MCNC: 1021 PG/ML (ref 232–1245)

## 2023-01-23 PROCEDURE — G0439 PPPS, SUBSEQ VISIT: HCPCS | Performed by: INTERNAL MEDICINE

## 2023-01-23 PROCEDURE — 99214 OFFICE O/P EST MOD 30 MIN: CPT | Mod: 25 | Performed by: INTERNAL MEDICINE

## 2023-01-23 PROCEDURE — 36415 COLL VENOUS BLD VENIPUNCTURE: CPT | Performed by: INTERNAL MEDICINE

## 2023-01-23 PROCEDURE — 80053 COMPREHEN METABOLIC PANEL: CPT | Performed by: INTERNAL MEDICINE

## 2023-01-23 PROCEDURE — 86803 HEPATITIS C AB TEST: CPT | Performed by: INTERNAL MEDICINE

## 2023-01-23 PROCEDURE — 80061 LIPID PANEL: CPT | Performed by: INTERNAL MEDICINE

## 2023-01-23 PROCEDURE — 82043 UR ALBUMIN QUANTITATIVE: CPT | Performed by: INTERNAL MEDICINE

## 2023-01-23 PROCEDURE — 82570 ASSAY OF URINE CREATININE: CPT | Performed by: INTERNAL MEDICINE

## 2023-01-23 PROCEDURE — 82607 VITAMIN B-12: CPT | Performed by: INTERNAL MEDICINE

## 2023-01-23 PROCEDURE — 83036 HEMOGLOBIN GLYCOSYLATED A1C: CPT | Performed by: INTERNAL MEDICINE

## 2023-01-23 RX ORDER — ROSUVASTATIN CALCIUM 20 MG/1
20 TABLET, COATED ORAL EVERY OTHER DAY
Qty: 90 TABLET | Refills: 3 | Status: SHIPPED | OUTPATIENT
Start: 2023-01-23 | End: 2023-08-22

## 2023-01-23 RX ORDER — LOSARTAN POTASSIUM AND HYDROCHLOROTHIAZIDE 25; 100 MG/1; MG/1
1 TABLET ORAL DAILY
Qty: 90 TABLET | Refills: 3 | Status: SHIPPED | OUTPATIENT
Start: 2023-01-23 | End: 2023-12-19

## 2023-01-23 ASSESSMENT — ENCOUNTER SYMPTOMS
ARTHRALGIAS: 0
WEAKNESS: 0
HEMATURIA: 0
FEVER: 0
COUGH: 0
DIARRHEA: 0
DIZZINESS: 0
JOINT SWELLING: 0
NAUSEA: 0
FREQUENCY: 0
EYE PAIN: 0
PARESTHESIAS: 0
HEARTBURN: 0
SORE THROAT: 0
BREAST MASS: 0
HEMATOCHEZIA: 0
HEADACHES: 0
CHILLS: 0
DYSURIA: 0
NERVOUS/ANXIOUS: 0
SHORTNESS OF BREATH: 0
CONSTIPATION: 0
MYALGIAS: 1
ABDOMINAL PAIN: 0
PALPITATIONS: 0

## 2023-01-23 ASSESSMENT — ACTIVITIES OF DAILY LIVING (ADL): CURRENT_FUNCTION: NO ASSISTANCE NEEDED

## 2023-01-23 NOTE — PROGRESS NOTES
"SUBJECTIVE:   Shruthi is a 67 year old who presents for Preventive Visit.    Are you in the first 12 months of your Medicare coverage?  declined    Healthy Habits:     In general, how would you rate your overall health?  Good    Frequency of exercise:  None    Do you usually eat at least 4 servings of fruit and vegetables a day, include whole grains    & fiber and avoid regularly eating high fat or \"junk\" foods?  Yes    Taking medications regularly:  Yes    Barriers to taking medications:  None    Ability to successfully perform activities of daily living:  No assistance needed    Home Safety:  Lack of grab bars in the bathroom    Hearing Impairment:  Difficulty following a conversation in a noisy restaurant or crowded room    In the past 6 months, have you been bothered by leaking of urine? Yes    In general, how would you rate your overall mental or emotional health?  Good      PHQ-2 Total Score: 2    Additional concerns today:  Yes    Doing ok, had Covid last spring, better now.    Had her shots for covid and flu.      Questions on blood pressure and different supplements.     Have you ever done Advance Care Planning? (For example, a Health Directive, POLST, or a discussion with a medical provider or your loved ones about your wishes): No, advance care planning information given to patient to review.  Patient plans to discuss their wishes with loved ones or provider.       Fall risk  Fallen 2 or more times in the past year?: No  Any fall with injury in the past year?: No    Cognitive Screening   1) Repeat 3 items (Leader, Season, Table)    2) Clock draw: NORMAL  3) 3 item recall: Recalls 3 objects  Results: 3 items recalled: COGNITIVE IMPAIRMENT LESS LIKELY    Mini-CogTM Copyright MIO Hughes. Licensed by the author for use in [facility]; reprinted with permission (immanuel@.[facility]). All rights reserved.      Reviewed and updated as needed this visit by clinical staff                  Reviewed and updated " as needed this visit by Provider                 Social History     Tobacco Use     Smoking status: Never     Smokeless tobacco: Never   Substance Use Topics     Alcohol use: Not Currently     Comment: Once a year maybe at the most     Alcohol Use 1/21/2023   Prescreen: >3 drinks/day or >7 drinks/week? Not Applicable     Current providers sharing in care for this patient include:   Patient Care Team:  Luis Alston MD as PCP - General (Internal Medicine)  Luis Alston MD as Assigned PCP    The following health maintenance items are reviewed in Epic and correct as of today:  Health Maintenance   Topic Date Due     DEXA  Never done     DIABETIC FOOT EXAM  Never done     ANNUAL REVIEW OF HM ORDERS  Never done     ADVANCE CARE PLANNING  Never done     EYE EXAM  Never done     COLORECTAL CANCER SCREENING  Never done     HEPATITIS C SCREENING  Never done     BMP  01/08/2022     MICROALBUMIN  01/08/2022     A1C  05/09/2022     MEDICARE ANNUAL WELLNESS VISIT  09/15/2022     DTAP/TDAP/TD IMMUNIZATION (2 - Td or Tdap) 10/12/2022     LIPID  02/09/2023     MAMMO SCREENING  03/03/2023     FALL RISK ASSESSMENT  01/23/2024     PHQ-2 (once per calendar year)  Completed     INFLUENZA VACCINE  Completed     Pneumococcal Vaccine: 65+ Years  Completed     ZOSTER IMMUNIZATION  Completed     COVID-19 Vaccine  Completed     IPV IMMUNIZATION  Aged Out     MENINGITIS IMMUNIZATION  Aged Out     Lab work is in process  Pneumonia Vaccine:up to date      FHS-7:   Breast CA Risk Assessment (FHS-7) 1/21/2023   Did any of your first-degree relatives have breast or ovarian cancer? Yes   Did any of your relatives have bilateral breast cancer? Yes   Did any man in your family have breast cancer? No   Did any woman in your family have breast and ovarian cancer? Unknown   Did any woman in your family have breast cancer before age 50 y? Yes   Do you have 2 or more relatives with breast and/or ovarian cancer? Yes   Do you have 2 or more relatives  "with breast and/or bowel cancer? No       Mammogram Screening: Recommended mammography every 1-2 years with patient discussion and risk factor consideration  Pertinent mammograms are reviewed under the imaging tab.    Review of Systems   Constitutional: Negative for chills and fever.   HENT: Positive for hearing loss. Negative for congestion, ear pain and sore throat.    Eyes: Negative for pain and visual disturbance.   Respiratory: Negative for cough and shortness of breath.    Cardiovascular: Negative for chest pain, palpitations and peripheral edema.   Gastrointestinal: Negative for abdominal pain, constipation, diarrhea, heartburn, hematochezia and nausea.   Breasts:  Negative for tenderness, breast mass and discharge.   Genitourinary: Negative for dysuria, frequency, genital sores, hematuria, pelvic pain, urgency, vaginal bleeding and vaginal discharge.   Musculoskeletal: Positive for myalgias. Negative for arthralgias and joint swelling.   Skin: Negative for rash.   Neurological: Negative for dizziness, weakness, headaches and paresthesias.   Psychiatric/Behavioral: Negative for mood changes. The patient is not nervous/anxious.        OBJECTIVE:   /66   Pulse 70   Temp 97.9  F (36.6  C)   Resp 10   Ht 1.615 m (5' 3.58\")   Wt 64.4 kg (142 lb)   SpO2 100%   BMI 24.70 kg/m   Estimated body mass index is 25.47 kg/m  as calculated from the following:    Height as of 1/11/22: 1.588 m (5' 2.5\").    Weight as of 6/10/22: 64.2 kg (141 lb 8 oz).  Physical Exam  GENERAL: healthy, alert and no distress  EYES: Eyes grossly normal to inspection, PERRL and conjunctivae and sclerae normal  HENT: ear canals and TM's normal, nose and mouth without ulcers or lesions  NECK: no adenopathy, no asymmetry, masses, or scars and thyroid normal to palpation  RESP: lungs clear to auscultation - no rales, rhonchi or wheezes  CV: regular rate and rhythm, normal S1 S2, no S3 or S4, no murmur, click or rub, no peripheral edema " and peripheral pulses strong  ABDOMEN: soft, nontender, no hepatosplenomegaly, no masses and bowel sounds normal  MS: no gross musculoskeletal defects noted, no edema  SKIN: no suspicious lesions or rashes  NEURO: Normal strength and tone, mentation intact and speech normal  PSYCH: mentation appears normal, affect normal/bright        ASSESSMENT / PLAN:       ICD-10-CM    1. Medicare annual wellness visit, subsequent  Z00.00       2. Type 2 diabetes mellitus without complication, without long-term current use of insulin (H)  E11.9 rosuvastatin (CRESTOR) 20 MG tablet     OFFICE/OUTPT VISIT,EST,LEVL IV     Vitamin B12     Hemoglobin A1c     Vitamin B12     Hemoglobin A1c      3. Hyperlipidemia LDL goal <100  E78.5 Comprehensive metabolic panel (BMP + Alb, Alk Phos, ALT, AST, Total. Bili, TP)     Lipid panel reflex to direct LDL Fasting     LipoFit by NMR     rosuvastatin (CRESTOR) 20 MG tablet     OFFICE/OUTPT VISIT,EST,LEVL IV     Comprehensive metabolic panel (BMP + Alb, Alk Phos, ALT, AST, Total. Bili, TP)     Lipid panel reflex to direct LDL Fasting     LipoFit by NMR      4. Essential hypertension  I10 Albumin Random Urine Quantitative with Creat Ratio     losartan-hydrochlorothiazide (HYZAAR) 100-25 MG tablet     OFFICE/OUTPT VISIT,EST,LEVL IV     Albumin Random Urine Quantitative with Creat Ratio      5. Seasonal depression (H)  F33.8 sertraline (ZOLOFT) 50 MG tablet        Patient is here for Medicare wellness check.  Overall she is doing pretty well.  Her memory testing is good, no falls, mammograms done at [facility].  She says her colonoscopy is good until age 69.  Immunizations are up-to-date.    Other issues addressed today we discussed her depression she has been on Zoloft for 25 years.  We will try to wean her down and she is feeling good.  Her children will keep an eye on her.  COVID on a 75 mg for a month and 50 mg and 25 mg if continue to feel okay we can then try a 25 mg pill into half of  that.    We discussed her blood pressure medication which is working well she asked about the toxicities on losartan but that should be cleared up I did refer her to the pharmacist to make sure her batch is fine.  She can try to go off blood pressure pills in the future but I would go off to meds at once.    Type 2 diabetes on diet control we will check her A1c.    Hyperlipidemia we will try some more testing for her lipids but recommend she stay on the statin with her diabetes anyways.          Patient has been advised of split billing requirements and indicates understanding: Yes      COUNSELING:  Reviewed preventive health counseling, as reflected in patient instructions       Regular exercise       Healthy diet/nutrition        She reports that she has never smoked. She has never used smokeless tobacco.      Appropriate preventive services were discussed with this patient, including applicable screening as appropriate for cardiovascular disease, diabetes, osteopenia/osteoporosis, and glaucoma.  As appropriate for age/gender, discussed screening for colorectal cancer, prostate cancer, breast cancer, and cervical cancer. Checklist reviewing preventive services available has been given to the patient.    Reviewed patients plan of care and provided an AVS. The Basic Care Plan (routine screening as documented in Health Maintenance) for Shruthi meets the Care Plan requirement. This Care Plan has been established and reviewed with the Patient.      Luis Alston MD  [facility]    Identified Health Risks:

## 2023-01-24 ENCOUNTER — MYC MEDICAL ADVICE (OUTPATIENT)
Dept: INTERNAL MEDICINE | Facility: CLINIC | Age: 68
End: 2023-01-24
Payer: MEDICARE

## 2023-01-24 DIAGNOSIS — R74.8 ELEVATED ALKALINE PHOSPHATASE LEVEL: Primary | ICD-10-CM

## 2023-01-24 LAB — HCV AB SERPL QL IA: NONREACTIVE

## 2023-01-27 LAB
CHOLEST SERPL-MCNC: 419 MG/DL
HDL SERPL QN: 8.1 NM
HDL SERPL-SCNC: >41 UMOL/L
HDLC SERPL-MCNC: 64 MG/DL
HLD.LARGE SERPL-SCNC: <2.8 UMOL/L
LDL SERPL QN: 20.3 NM
LDL SERPL-SCNC: >2500 NMOL/L
LDL SMALL SERPL-SCNC: >1085 NMOL/L
LDLC SERPL CALC-MCNC: 294 MG/DL
PATHOLOGY STUDY: ABNORMAL
TRIGL SERPL-MCNC: 304 MG/DL
VLDL LARGE SERPL-SCNC: 14.8 NMOL/L
VLDL SERPL QN: 54 NM

## 2023-03-09 ENCOUNTER — MYC MEDICAL ADVICE (OUTPATIENT)
Dept: INTERNAL MEDICINE | Facility: CLINIC | Age: 68
End: 2023-03-09
Payer: MEDICARE

## 2023-04-09 DIAGNOSIS — F33.8 SEASONAL DEPRESSION (H): ICD-10-CM

## 2023-04-10 RX ORDER — SERTRALINE HYDROCHLORIDE 100 MG/1
TABLET, FILM COATED ORAL
Qty: 90 TABLET | Refills: 0 | Status: SHIPPED | OUTPATIENT
Start: 2023-04-10 | End: 2023-08-22

## 2023-05-08 ENCOUNTER — HEALTH MAINTENANCE LETTER (OUTPATIENT)
Age: 68
End: 2023-05-08

## 2023-06-01 ENCOUNTER — LAB (OUTPATIENT)
Dept: LAB | Facility: CLINIC | Age: 68
End: 2023-06-01
Payer: MEDICARE

## 2023-06-01 DIAGNOSIS — I10 ESSENTIAL HYPERTENSION: ICD-10-CM

## 2023-06-01 DIAGNOSIS — E11.9 TYPE 2 DIABETES MELLITUS WITHOUT COMPLICATION, WITHOUT LONG-TERM CURRENT USE OF INSULIN (H): ICD-10-CM

## 2023-06-01 LAB
ANION GAP SERPL CALCULATED.3IONS-SCNC: 11 MMOL/L (ref 7–15)
BUN SERPL-MCNC: 35.8 MG/DL (ref 8–23)
CALCIUM SERPL-MCNC: 9.8 MG/DL (ref 8.8–10.2)
CHLORIDE SERPL-SCNC: 103 MMOL/L (ref 98–107)
CREAT SERPL-MCNC: 0.87 MG/DL (ref 0.51–0.95)
CREAT UR-MCNC: 104.6 MG/DL
DEPRECATED HCO3 PLAS-SCNC: 27 MMOL/L (ref 22–29)
GFR SERPL CREATININE-BSD FRML MDRD: 72 ML/MIN/1.73M2
GLUCOSE SERPL-MCNC: 118 MG/DL (ref 70–99)
HBA1C MFR BLD: 5.8 %
MICROALBUMIN UR-MCNC: <12 MG/L
MICROALBUMIN/CREAT UR: NORMAL MG/G{CREAT}
POTASSIUM SERPL-SCNC: 4.6 MMOL/L (ref 3.4–5.3)
SODIUM SERPL-SCNC: 141 MMOL/L (ref 136–145)

## 2023-06-01 PROCEDURE — 82043 UR ALBUMIN QUANTITATIVE: CPT

## 2023-06-01 PROCEDURE — 80048 BASIC METABOLIC PNL TOTAL CA: CPT

## 2023-06-01 PROCEDURE — 36415 COLL VENOUS BLD VENIPUNCTURE: CPT

## 2023-06-01 PROCEDURE — 83036 HEMOGLOBIN GLYCOSYLATED A1C: CPT

## 2023-06-01 PROCEDURE — 82570 ASSAY OF URINE CREATININE: CPT

## 2023-06-05 ENCOUNTER — MYC MEDICAL ADVICE (OUTPATIENT)
Dept: INTERNAL MEDICINE | Facility: CLINIC | Age: 68
End: 2023-06-05
Payer: MEDICARE

## 2023-07-31 DIAGNOSIS — I10 ESSENTIAL HYPERTENSION: ICD-10-CM

## 2023-07-31 RX ORDER — LOSARTAN POTASSIUM AND HYDROCHLOROTHIAZIDE 25; 100 MG/1; MG/1
1 TABLET ORAL DAILY
Qty: 90 TABLET | Refills: 3 | OUTPATIENT
Start: 2023-07-31

## 2023-08-01 DIAGNOSIS — I10 ESSENTIAL HYPERTENSION: ICD-10-CM

## 2023-08-01 RX ORDER — LOSARTAN POTASSIUM AND HYDROCHLOROTHIAZIDE 25; 100 MG/1; MG/1
1 TABLET ORAL DAILY
Qty: 90 TABLET | Refills: 3 | OUTPATIENT
Start: 2023-08-01

## 2023-08-19 ENCOUNTER — APPOINTMENT (OUTPATIENT)
Dept: GENERAL RADIOLOGY | Facility: CLINIC | Age: 68
End: 2023-08-19
Attending: EMERGENCY MEDICINE
Payer: MEDICARE

## 2023-08-19 ENCOUNTER — APPOINTMENT (OUTPATIENT)
Dept: CT IMAGING | Facility: CLINIC | Age: 68
End: 2023-08-19
Attending: EMERGENCY MEDICINE
Payer: MEDICARE

## 2023-08-19 ENCOUNTER — HOSPITAL ENCOUNTER (EMERGENCY)
Facility: CLINIC | Age: 68
Discharge: HOME | End: 2023-08-19
Attending: EMERGENCY MEDICINE | Admitting: EMERGENCY MEDICINE
Payer: MEDICARE

## 2023-08-19 VITALS
BODY MASS INDEX: 21.91 KG/M2 | RESPIRATION RATE: 18 BRPM | WEIGHT: 126 LBS | HEART RATE: 74 BPM | DIASTOLIC BLOOD PRESSURE: 91 MMHG | OXYGEN SATURATION: 99 % | TEMPERATURE: 98 F | SYSTOLIC BLOOD PRESSURE: 151 MMHG

## 2023-08-19 DIAGNOSIS — S50.312A ABRASION OF LEFT ELBOW, INITIAL ENCOUNTER: ICD-10-CM
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
## 2023-08-19 DIAGNOSIS — S80.212A ABRASION OF LEFT KNEE, INITIAL ENCOUNTER: ICD-10-CM

## 2023-08-19 DIAGNOSIS — M25.50 MULTIPLE JOINT PAIN: ICD-10-CM

## 2023-08-19 DIAGNOSIS — S16.1XXA NECK STRAIN, INITIAL ENCOUNTER: ICD-10-CM

## 2023-08-19 PROCEDURE — 99284 EMERGENCY DEPT VISIT MOD MDM: CPT | Performed by: EMERGENCY MEDICINE

## 2023-08-19 PROCEDURE — 250N000013 HC RX MED GY IP 250 OP 250 PS 637: Performed by: EMERGENCY MEDICINE

## 2023-08-19 PROCEDURE — 99285 EMERGENCY DEPT VISIT HI MDM: CPT | Mod: 25 | Performed by: EMERGENCY MEDICINE

## 2023-08-19 PROCEDURE — 73562 X-RAY EXAM OF KNEE 3: CPT | Mod: LT

## 2023-08-19 PROCEDURE — G1010 CDSM STANSON: HCPCS

## 2023-08-19 PROCEDURE — 73080 X-RAY EXAM OF ELBOW: CPT | Mod: LT

## 2023-08-19 RX ORDER — CYCLOBENZAPRINE HCL 10 MG
5-10 TABLET ORAL 3 TIMES DAILY PRN
Qty: 12 TABLET | Refills: 0 | Status: SHIPPED | OUTPATIENT
Start: 2023-08-19 | End: 2023-12-19

## 2023-08-19 RX ORDER — LIDOCAINE 4 G/G
1 PATCH TOPICAL ONCE
Status: DISCONTINUED | OUTPATIENT
Start: 2023-08-19 | End: 2023-08-19 | Stop reason: HOSPADM

## 2023-08-19 RX ADMIN — LIDOCAINE 1 PATCH: 560 PATCH PERCUTANEOUS; TOPICAL; TRANSDERMAL at 11:14

## 2023-08-19 ASSESSMENT — ACTIVITIES OF DAILY LIVING (ADL)
ADLS_ACUITY_SCORE: 35
ADLS_ACUITY_SCORE: 35

## 2023-08-19 NOTE — DISCHARGE INSTRUCTIONS
Rest, ice or heat over painful areas.    Tylenol if needed for pain.    If the lidocaine patch helps you can buy this over-the-counter.  You could also buy lidocaine topical gel to place over areas of discomfort.    Flexeril if needed for pain and muscle spasm.  Can cause drowsiness so no driving while on this medication.    Follow-up in clinic if not improving over the next couple of weeks.  Return at anytime for worsening, changes or concerns.    I hope that you can get a working vehicle soon.  Enjoy your beautiful flower garden!
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
## 2023-08-21 ASSESSMENT — ANXIETY QUESTIONNAIRES
7. FEELING AFRAID AS IF SOMETHING AWFUL MIGHT HAPPEN: NOT AT ALL
6. BECOMING EASILY ANNOYED OR IRRITABLE: SEVERAL DAYS
IF YOU CHECKED OFF ANY PROBLEMS ON THIS QUESTIONNAIRE, HOW DIFFICULT HAVE THESE PROBLEMS MADE IT FOR YOU TO DO YOUR WORK, TAKE CARE OF THINGS AT HOME, OR GET ALONG WITH OTHER PEOPLE: NOT DIFFICULT AT ALL
GAD7 TOTAL SCORE: 5
5. BEING SO RESTLESS THAT IT IS HARD TO SIT STILL: NOT AT ALL
GAD7 TOTAL SCORE: 5
1. FEELING NERVOUS, ANXIOUS, OR ON EDGE: SEVERAL DAYS
3. WORRYING TOO MUCH ABOUT DIFFERENT THINGS: SEVERAL DAYS
2. NOT BEING ABLE TO STOP OR CONTROL WORRYING: SEVERAL DAYS
4. TROUBLE RELAXING: SEVERAL DAYS

## 2023-08-21 ASSESSMENT — PATIENT HEALTH QUESTIONNAIRE - PHQ9
SUM OF ALL RESPONSES TO PHQ QUESTIONS 1-9: 5
SUM OF ALL RESPONSES TO PHQ QUESTIONS 1-9: 5
10. IF YOU CHECKED OFF ANY PROBLEMS, HOW DIFFICULT HAVE THESE PROBLEMS MADE IT FOR YOU TO DO YOUR WORK, TAKE CARE OF THINGS AT HOME, OR GET ALONG WITH OTHER PEOPLE: NOT DIFFICULT AT ALL

## 2023-08-22 ENCOUNTER — VIRTUAL VISIT (OUTPATIENT)
Dept: INTERNAL MEDICINE | Facility: CLINIC | Age: 68
End: 2023-08-22
Payer: MEDICARE

## 2023-08-22 DIAGNOSIS — Z12.11 SCREEN FOR COLON CANCER: ICD-10-CM

## 2023-08-22 DIAGNOSIS — E11.9 TYPE 2 DIABETES MELLITUS WITHOUT COMPLICATION, WITHOUT LONG-TERM CURRENT USE OF INSULIN (H): Primary | ICD-10-CM

## 2023-08-22 DIAGNOSIS — I10 ESSENTIAL HYPERTENSION: ICD-10-CM

## 2023-08-22 PROCEDURE — 99213 OFFICE O/P EST LOW 20 MIN: CPT | Mod: VID | Performed by: INTERNAL MEDICINE

## 2023-08-22 RX ORDER — LOSARTAN POTASSIUM AND HYDROCHLOROTHIAZIDE 12.5; 5 MG/1; MG/1
1 TABLET ORAL DAILY
Qty: 90 TABLET | Refills: 3 | Status: SHIPPED | OUTPATIENT
Start: 2023-08-22 | End: 2024-08-29

## 2023-08-22 NOTE — PROGRESS NOTES
Shruthi is a 68 year old who is being evaluated via a billable video visit.      How would you like to obtain your AVS? MyChart  If the video visit is dropped, the invitation should be resent by: Text to cell phone: [phone]  Will anyone else be joining your video visit? No      Patient completed E-check in. Questionnaires were blown in and Patient checked in electronically without being called.             EMR reviewed including:             Complaint, History of Chief Complaint, Corresponding Review of Systems, and Complaint Specific Physical Examination.    #1   Patient initially wishes to establish care.    Patient is irritated with her primary care provider because she was cutting her blood pressure pills in half.    Patient notes that she talked to the pharmacist about having the dosage of her medication reduced.    Patient notes the pharmacist/pharmacist representative suggested that they contacted Dr. Alston and gave him this message.    Reviewed chart with patient suggesting that Dr. Alston had no knowledge that she was cutting her pills in half and did not have communication with the pharmacy in any way.    I took the liberty of refilling the patient's medication at a lower dosage so she does not have to cut her losartan/hydrochlorothiazide in half.    I explained to the patient that Dr. Alston was not being difficult but rather was not even aware of the situation.    Patient now has no interest in establishing care with me.          Exam:  She is on the computer screen.  She appears to be alert and oriented.  She appears to be breathing adequately.  She appears to be in no acute physical distress.  She is not coughing or throwing up.  Her color is very poor because she is under in congestive lighting and subsequently orange tinted.  I find this to be artifactual.        Patient has been interviewed, applicable history and applied review of systems have been performed.    Vital Signs:   There were no  vitals taken for this visit.      Decision Making    Problem and Complexity       1. Essential hypertension  Medication refilled at 50/12.5 mg.  - losartan-hydrochlorothiazide (HYZAAR) 50-12.5 MG tablet; Take 1 tablet by mouth daily  Dispense: 90 tablet; Refill: 3          Recommend also up with her primary care provider for ongoing medical care including diabetic management, hypertension management, and lipid management.        I have carefully explained the diagnosis and treatment options to the patient.  The patient has displayed an understanding of the above, and all subsequent questions were answered.    Video information:  Start time:   5:02 PM  End time:   5:07 PM  Duration:   5 minutes  Origination:   By patient at her home  Format: Michael  Provider location:     In [place], Onsite, in my office, at my table.    DO NATA Morrison    Portions of this note were produced using [employer]  Although every attempt at real-time proof reading has been made, occasional grammar/syntax errors may have been missed.

## 2023-10-08 ENCOUNTER — HEALTH MAINTENANCE LETTER (OUTPATIENT)
Age: 68
End: 2023-10-08

## 2023-11-30 ENCOUNTER — E-VISIT (OUTPATIENT)
Dept: FAMILY MEDICINE | Facility: CLINIC | Age: 68
End: 2023-11-30
Payer: MEDICARE

## 2023-11-30 DIAGNOSIS — J01.00 ACUTE MAXILLARY SINUSITIS, RECURRENCE NOT SPECIFIED: Primary | ICD-10-CM

## 2023-11-30 PROCEDURE — 99421 OL DIG E/M SVC 5-10 MIN: CPT | Performed by: INTERNAL MEDICINE

## 2023-11-30 RX ORDER — CEFUROXIME AXETIL 500 MG/1
500 TABLET ORAL 2 TIMES DAILY
Qty: 20 TABLET | Refills: 0 | Status: SHIPPED | OUTPATIENT
Start: 2023-11-30 | End: 2023-12-19

## 2023-11-30 NOTE — PATIENT INSTRUCTIONS
Thank you for choosing us for your care. I have placed an order for a prescription so that you can start treatment. View your full visit summary for details by clicking on the link below. Your pharmacist will able to address any questions you may have about the medication.     If you're not feeling better within 5-7 days, please schedule an appointment.  You can schedule an appointment right here in [facility], or call [phone]  If the visit is for the same symptoms as your eVisit, we'll refund the cost of your eVisit if seen within seven days.

## 2023-12-17 ENCOUNTER — HOSPITAL ENCOUNTER (EMERGENCY)
Facility: CLINIC | Age: 68
Discharge: HOME | End: 2023-12-17
Attending: NURSE PRACTITIONER | Admitting: NURSE PRACTITIONER
Payer: MEDICARE

## 2023-12-17 ENCOUNTER — APPOINTMENT (OUTPATIENT)
Dept: GENERAL RADIOLOGY | Facility: CLINIC | Age: 68
End: 2023-12-17
Attending: NURSE PRACTITIONER
Payer: MEDICARE

## 2023-12-17 VITALS
WEIGHT: 128.9 LBS | RESPIRATION RATE: 18 BRPM | SYSTOLIC BLOOD PRESSURE: 155 MMHG | HEART RATE: 94 BPM | DIASTOLIC BLOOD PRESSURE: 81 MMHG | BODY MASS INDEX: 22.42 KG/M2 | OXYGEN SATURATION: 99 %

## 2023-12-17 DIAGNOSIS — M54.6 ACUTE LEFT-SIDED THORACIC BACK PAIN: ICD-10-CM

## 2023-12-17 DIAGNOSIS — M25.522 LEFT ELBOW PAIN: ICD-10-CM
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
## 2023-12-17 DIAGNOSIS — M79.622 LEFT UPPER ARM PAIN: ICD-10-CM

## 2023-12-17 PROCEDURE — 73060 X-RAY EXAM OF HUMERUS: CPT | Mod: LT

## 2023-12-17 PROCEDURE — 99283 EMERGENCY DEPT VISIT LOW MDM: CPT

## 2023-12-17 PROCEDURE — 99283 EMERGENCY DEPT VISIT LOW MDM: CPT | Performed by: NURSE PRACTITIONER

## 2023-12-17 RX ORDER — ACETAMINOPHEN 500 MG
1000 TABLET ORAL ONCE
Status: DISCONTINUED | OUTPATIENT
Start: 2023-12-17 | End: 2023-12-17 | Stop reason: HOSPADM

## 2023-12-17 ASSESSMENT — ACTIVITIES OF DAILY LIVING (ADL): ADLS_ACUITY_SCORE: 35

## 2023-12-17 NOTE — LETTER
December 17, 2023      To Whom It May Concern:      Shruthi Rivas was seen in our Emergency Department today, 12/17/23.     No work 12/18/2023 - 12/20/2023.      Sincerely,        BHUMI Zaragoza CNP

## 2023-12-18 NOTE — DISCHARGE INSTRUCTIONS
Wear sling to support your arm.   Ice packs.  Tylenol 650 mg every 4-6 hours as needed for pain.  Follow-up with orthopedics for recheck. Referral has been sent. They should call you to set this up.

## 2023-12-18 NOTE — ED PROVIDER NOTES
History     Chief Complaint   Patient presents with    Arm Injury     HPI  Shruthi Rivas is a 68 year old female who comes for evaluation of left upper arm pain after falling.  She was on a kitchen chair hanging a picture frame when she slipped and fell landing on her left side.  She states she heard and felt a crack in her mid upper left arm.  Denies hitting her head.  Denies head or neck pain.  Denies LOC.  She was able to get herself up off the floor and changed out of her pajamas into close and drove herself here for evaluation. She is not on any blood thinners.  Denies chest pain or shortness of breath.    Allergies:  Allergies   Allergen Reactions    Iodinated Contrast Media Diarrhea and Nausea and Vomiting    Iodine Anaphylaxis    Codeine      Vomiting     Dilaudid [Hydromorphone]     Eucalyptus Oil     Ibuprofen      Up set stomach, sweating, throw up, diarrhea     Morphine      vomiting    Other [No Clinical Screening - See Comments]      IV dye     Pravastatin     Shellfish-Derived Products     Sulfa Antibiotics        Problem List:    There are no problems to display for this patient.       Past Medical History:    Past Medical History:   Diagnosis Date    Cancer (H) 2020    Depressive disorder     Essential hypertension     History of blood transfusion     Type 2 diabetes mellitus (H)        Past Surgical History:    Past Surgical History:   Procedure Laterality Date    ABDOMEN SURGERY  C section     BACK SURGERY      BIOPSY  Feb 3 2021    basal cell I also had breast biopsies    COLONOSCOPY      COSMETIC SURGERY      under eye basal cell    GYN SURGERY      Hysterectomy    ORTHOPEDIC SURGERY      Left wrist    WRIST SURGERY      fracture and then a plate.        Family History:    Family History   Problem Relation Age of Onset    Diabetes Other         Amanda  type 1    Diabetes Father         Type 2    Coronary Artery Disease Father         2 open Heart sx  May  2020 at 92    Hypertension Father     Diabetes Other     Coronary Artery Disease Paternal Grandmother          at 93    Hypertension Paternal Grandmother     Hypertension Sister     Hyperlipidemia Sister     Hyperlipidemia Sister     Breast Cancer Sister         Ductal w/mastectomy    Osteoporosis Sister     Cerebrovascular Disease Maternal Grandfather     Breast Cancer Cousin         Maternal 1st cousin     Breast Cancer Cousin         Maternal 1st cousin    Breast Cancer Cousin         Maternal 1st cousin    Breast Cancer Cousin         Maternal 1st cousin and all of his sisters    Colon Cancer Maternal Grandmother          at 84    Prostate Cancer Paternal Grandfather     Other Cancer Mother         Melanoma-Brain mets    Depression Brother        Social History:  Marital Status:   [4]  Social History     Tobacco Use    Smoking status: Never    Smokeless tobacco: Never   Vaping Use    Vaping Use: Never used   Substance Use Topics    Alcohol use: Not Currently     Comment: Once a year maybe at the most    Drug use: Never        Medications:    cefuroxime (CEFTIN) 500 MG tablet  cyclobenzaprine (FLEXERIL) 10 MG tablet  losartan-hydrochlorothiazide (HYZAAR) 100-25 MG tablet  losartan-hydrochlorothiazide (HYZAAR) 50-12.5 MG tablet  multivitamin w/minerals (CENTRUM ADULTS) tablet  Vitamin D, Cholecalciferol, 25 MCG (1000 UT) TABS          Review of Systems    Physical Exam   BP: (!) 160/81  Pulse: 96  Resp: 20  Weight: 58.5 kg (128 lb 14.4 oz)  SpO2: 100 %      Physical Exam  Constitutional:       General: She is in acute distress (Tearful).      Appearance: She is well-developed. She is not ill-appearing.   HENT:      Head: Normocephalic and atraumatic.      Right Ear: Ear canal and external ear normal.      Left Ear: Ear canal and external ear normal.      Nose: Nose normal.      Mouth/Throat:      Mouth: Mucous membranes are moist.   Eyes:      Conjunctiva/sclera: Conjunctivae normal.    Cardiovascular:      Rate and Rhythm: Normal rate and regular rhythm.      Heart sounds: Normal heart sounds. No murmur heard.  Pulmonary:      Effort: Pulmonary effort is normal. No respiratory distress.      Breath sounds: Normal breath sounds.   Musculoskeletal:         General: Normal range of motion.      Left upper arm: Tenderness (distal humerus) present. No swelling or deformity.      Left elbow: No swelling. Normal range of motion. Tenderness present in olecranon process.      Left wrist: Normal. No swelling or tenderness.      Cervical back: Normal and full passive range of motion without pain. Muscular tenderness (left side trapezius region) present. No pain with movement or spinous process tenderness.      Thoracic back: Tenderness (focal to between scapula and spine.) present. No swelling.      Lumbar back: Normal.        Back:       Comments: No midline spine tenderness.   Skin:     General: Skin is warm and dry.      Findings: No rash.   Neurological:      General: No focal deficit present.      Mental Status: She is alert and oriented to person, place, and time.         ED Course                 Procedures         Results for orders placed or performed during the hospital encounter of 12/17/23 (from the past 24 hour(s))   Humerus XR,  G/E 2 views, left    Narrative    EXAM: XR HUMERUS LEFT G/E 2 VIEWS  LOCATION: [facility]  DATE: 12/17/2023    INDICATION: Fall, pain.  COMPARISON: None.      Impression    IMPRESSION: No acute left humerus fracture or dislocation. There are mild degenerative changes at the left elbow joint.       Medications   acetaminophen (TYLENOL) tablet 1,000 mg (has no administration in time range)       Assessments & Plan (with Medical Decision Making)     Patient slipped and fell off a chair today landing on her left side.  She was able to get up on her own.  She denied her head or have loss conscious.  She drove herself here to the emergency  department to evaluate her left upper arm.  She complains of severe pain in the distal aspect left upper arm.  There is no swelling or ecchymosis.  She has full range of motion of her left wrist and elbow.  X-ray of her left humerus reveals no acute osseous abnormality.  I discussed the imaging results with patient.  After she had been in the room for a while she started to complain of some left sided upper back pain.  She describes this as focal to between her left scapula and her thoracic spine.  She has no midline spine tenderness.  She also is starting to feel sore on her left trapezius.  We discussed possible muscle soreness that may get a little bit worse before it gets better.  Patient was very concerned that she could possibly have a hairline fracture in her left humerus.  I did offer to CT her arm.  I also explained that we could put her arm in a sling, which is what we would do if she had a hairline fracture left humerus.  I have placed a referral for orthopedics for recheck if she is not improving.  Patient was agreeable to have sling placed.  She will wear this as needed for comfort.  Instructed use ice packs.  Tylenol for pain.  She was instructed of low threshold for returning if she has any new or concerning symptoms.    Wear sling to support your arm.   Ice packs.  Tylenol 650 mg every 4-6 hours as needed for pain.  Follow-up with orthopedics for recheck. Referral has been sent. They should call you to set this up.    New Prescriptions    No medications on file       Final diagnoses:   Left upper arm pain   Acute left-sided thoracic back pain   Fall, initial encounter   Left elbow pain       12/17/2023   [facility] EMERGENCY DEPT       Padimni, BHUMI Castorena CNP  12/17/23 [id number]

## 2023-12-18 NOTE — ED TRIAGE NOTES
Patient was standing on her kitchen chair hanging a picture on the wall and fell, landing on her L side. C/O pain to L arm.      Triage Assessment (Adult)       Row Name 12/17/23 2039          Triage Assessment    Airway WDL WDL        Respiratory WDL    Respiratory WDL WDL        Skin Circulation/Temperature WDL    Skin Circulation/Temperature WDL WDL

## 2023-12-19 ENCOUNTER — TELEPHONE (OUTPATIENT)
Dept: ORTHOPEDICS | Facility: CLINIC | Age: 68
End: 2023-12-19

## 2023-12-19 ENCOUNTER — OFFICE VISIT (OUTPATIENT)
Dept: ORTHOPEDICS | Facility: CLINIC | Age: 68
End: 2023-12-19
Payer: MEDICARE

## 2023-12-19 ENCOUNTER — ANCILLARY PROCEDURE (OUTPATIENT)
Dept: GENERAL RADIOLOGY | Facility: CLINIC | Age: 68
End: 2023-12-19
Attending: PHYSICIAN ASSISTANT
Payer: MEDICARE

## 2023-12-19 VITALS
HEIGHT: 64 IN | TEMPERATURE: 97.9 F | BODY MASS INDEX: 21.85 KG/M2 | SYSTOLIC BLOOD PRESSURE: 112 MMHG | WEIGHT: 128 LBS | DIASTOLIC BLOOD PRESSURE: 78 MMHG

## 2023-12-19 DIAGNOSIS — M25.522 LEFT ELBOW PAIN: ICD-10-CM

## 2023-12-19 DIAGNOSIS — S50.02XA CONTUSION OF LEFT ELBOW, INITIAL ENCOUNTER: ICD-10-CM

## 2023-12-19 PROCEDURE — 99203 OFFICE O/P NEW LOW 30 MIN: CPT | Performed by: PHYSICIAN ASSISTANT

## 2023-12-19 PROCEDURE — 73080 X-RAY EXAM OF ELBOW: CPT | Mod: TC | Performed by: RADIOLOGY

## 2023-12-19 RX ORDER — EZETIMIBE 10 MG/1
1 TABLET ORAL DAILY
COMMUNITY
Start: 2023-10-03 | End: 2024-02-09

## 2023-12-19 ASSESSMENT — PAIN SCALES - GENERAL: PAINLEVEL: SEVERE PAIN (6)

## 2023-12-19 NOTE — LETTER
December 19, 2023      Shruthi PERRY Evelyn  [address] [facility] [address]        To Whom It May Concern:    Shruthi Malloyyennytheresa  was seen on 12-.  Please excuse her from work  until 1-9-2024. Then she can return to without restrictions.    Sincerely,        Kiel Penny PA-C

## 2023-12-19 NOTE — PROGRESS NOTES
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  She did this work from home.  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  She really enjoys gardening biking and swimming at the [facility] and doing exercise.    She is being seen for   Chief Complaints and History of Present Illnesses   Patient presents with    Musculoskeletal Problem     Left elbow    Consult     REF:Yanni Washington CNP         History of Present Illness:   Mechanism of Injury: Patient was hanging a picture and standing on a chair when she fell on her left side and heard a crack.  Location: Left elbow  Duration of Pain: Since date of injury which was 12/17/2023  Rating of Pain: 6 out of 10  Pain Quality: Achy  Pain is better with: Rest  Pain is worse with: Activity  Treatment so far consists of: Patient was seen at the [place] emergency department on 12/17/2023 and an x-ray was done of the humerus showing no fracture.  Patient was offered a CT scan as she was very concerned that she had a hairline fracture.  Patient decided not to do the CT and was placed in a sling and had follow-up with orthopedics referred.   Associated Features: Denies numbness or tingling shooting burning electric pain.  Prior history of related problems: No previous major injury or trauma or surgery to the left elbow.  Pain is Limiting: Heavy use of left elbow and arm  Here to: Orthopedic consultation  The Pain Has: Gotten better  Additional History: Wants to make sure she does not have a fracture.      Patient's past medical, surgical, social and family histories reviewed.     Past Medical History:   Diagnosis Date    Cancer (H) Jan 2020    Basal Cell Carcinoma    Depressive disorder     Essential hypertension     History of blood transfusion 2007    needed from hysterectomy surgery    Type 2 diabetes mellitus (H)         Past  Surgical History:   Procedure Laterality Date    ABDOMEN SURGERY  C section     BACK SURGERY      BIOPSY  Feb 3 2021    basal cell I also had breast biopsies    COLONOSCOPY      COSMETIC SURGERY      under eye basal cell    GYN SURGERY      Hysterectomy    ORTHOPEDIC SURGERY      Left wrist    WRIST SURGERY      fracture and then a plate.        Medications:  losartan-hydrochlorothiazide (HYZAAR) 50-12.5 MG tablet, Take 1 tablet by mouth daily  multivitamin w/minerals (CENTRUM ADULTS) tablet, Take 1 tablet by mouth daily  Vitamin D, Cholecalciferol, 25 MCG (1000 UT) TABS, Take 1,000 Units by mouth daily  ezetimibe (ZETIA) 10 MG tablet, Take 1 tablet by mouth daily (Patient not taking: Reported on 2023)    No current facility-administered medications on file prior to visit.      Allergies   Allergen Reactions    Iodinated Contrast Media Diarrhea and Nausea and Vomiting    Iodine Anaphylaxis    Codeine      Vomiting     Dilaudid [Hydromorphone]     Eucalyptus Oil     Ibuprofen      Up set stomach, sweating, throw up, diarrhea     Morphine      vomiting    Other [No Clinical Screening - See Comments]      IV dye     Pravastatin     Shellfish-Derived Products     Sulfa Antibiotics        Social History     Occupational History    Not on file   Tobacco Use    Smoking status: Never    Smokeless tobacco: Never   Vaping Use    Vaping Use: Never used   Substance and Sexual Activity    Alcohol use: Not Currently     Comment: Once a year maybe at the most    Drug use: Never    Sexual activity: Not Currently     Partners: Male     Birth control/protection: Other     Comment: Hysterectomy 2007       Family History   Problem Relation Age of Onset    Diabetes Other         Amanda  type 1    Diabetes Father         Type 2    Coronary Artery Disease Father         2 open Heart sx  May 2020 at 92    Hypertension Father     Diabetes Other     Coronary Artery Disease Paternal Grandmother          at  "93    Hypertension Paternal Grandmother     Hypertension Sister     Hyperlipidemia Sister     Hyperlipidemia Sister     Breast Cancer Sister         Ductal w/mastectomy    Osteoporosis Sister     Cerebrovascular Disease Maternal Grandfather     Breast Cancer Cousin         Maternal 1st cousin     Breast Cancer Cousin         Maternal 1st cousin    Breast Cancer Cousin         Maternal 1st cousin    Breast Cancer Cousin         Maternal 1st cousin and all of his sisters    Colon Cancer Maternal Grandmother          at 84    Prostate Cancer Paternal Grandfather     Other Cancer Mother         Melanoma-Brain mets    Depression Brother        REVIEW OF SYSTEMS  10 point review systems performed otherwise negative as noted as per history of present illness.    Physical Exam:  Vitals: /78 (BP Location: Right arm, Patient Position: Sitting, Cuff Size: Adult Regular)   Temp 97.9  F (36.6  C) (Temporal)   Ht 1.613 m (5' 3.5\")   Wt 58.1 kg (128 lb)   BMI 22.32 kg/m    BMI= Body mass index is 22.32 kg/m .    Constitutional: healthy, alert and no acute distress   Psychiatric: mentation appears normal and affect normal/bright  NEURO: no focal deficits, CMS intact left upper extremity   RESP: Normal with easy respirations and no use of accessory muscles to breathe, no audible wheezing or retractions  CV: +2 radial pulse and her hand is warm to palpation.   SKIN: No erythema, rashes, excoriation, or breakdown. No evidence of infection.   MUSCULOSKELETAL:  INSPECTION of left elbow: No gross deformities, erythema, edema, ecchymosis, atrophy or fasciculations.   PALPATION: No tenderness medial or lateral epicondyle or the olecranon or in the cubital fossa.  No tenderness forearm, wrist or upper arm.  No increased warmth.   ROM: Passive: Elbow flexion 155 degrees, elbow extension full, supination 90, pronation 90. The range of motion is without catching locking or pain.      STRENGTH: 5 out of 5 biceps and triceps as " well as pronation and supination and , interosseous and thumb without pain.   SPECIAL TEST: Stable to varus and valgus stress.  GAIT: non-antalgic  Lymph: no palpable lymph nodes    Diagnostic Modalities:  Recent Results (from the past 744 hour(s))   Humerus XR,  G/E 2 views, left    Narrative    EXAM: XR HUMERUS LEFT G/E 2 VIEWS  LOCATION: [facility]  DATE: 12/17/2023    INDICATION: Fall, pain.  COMPARISON: None.      Impression    IMPRESSION: No acute left humerus fracture or dislocation. There are mild degenerative changes at the left elbow joint.   I agree with above reading.    X-rays of elbow done today showing no fracture no dislocation no tumor and good joint spacing and good alignment within the x-ray.    Independent visualization of the images was performed.    Impression: 1.  2 days status post left elbow contusion.    Plan:  All of the above pertinent physical exam and imaging modalities findings was reviewed with Shruthi.    FOCUSED PLAN:  On exam she has acceptable range of motion and strength and is stable elbow.  X-rays reviewed of humerus and also got x-rays today of elbow showing no fracture.  Instructed on range of motion of elbow with flexion extension supination pronation 3-5 times a day and gradual increase of use of the left upper extremity.  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  We will keep her off for 3 weeks and then she can return to work without restrictions at that time.  We took in a form to fill out and we will get this back to her and also send it to her work.  Follow-up as needed.    Re-x-ray on return: No      This note was dictated with Funnely.    Kiel Penny PA-C

## 2023-12-19 NOTE — TELEPHONE ENCOUNTER
"Patient's form was filled out, reviewed, and signed by provider.      Form was left at the lower level specialty  for patient to .     A copy was sent to scanning.     A copy was placed in the lower level \"faxed\" file/drawer.      Patient was informed via voicemail.     Devi Infante RN   [facility]    "

## 2023-12-19 NOTE — TELEPHONE ENCOUNTER
Our goal is to have forms completed with 72 hours, however some forms may require a visit or additional information.    Who is the form from?: Patient  Where the form came from: Patient or family brought in     What clinic location was the form placed at?: [place]  Where the form was placed:  [employer] [facility]/Folder  What number is listed as a contact on the form?: [employer]    Phone call message- patient request for a letter, form or note:    Date needed: within one week  Patient will  at the clinic when completed  Has the patient signed a consent form for release of information? YES    Additional comments:     Call taken on 12/19/2023 at 10:45 AM by Miriam Matthews CMA    Type of letter, form or note: employer

## 2023-12-19 NOTE — LETTER
12/19/2023         RE: Shruthi Rivas  [address] [facility] [address]        Dear Colleague,    Thank you for referring your patient, Shruthi Rivas, to the [facility]. Please see a copy of my visit note below.    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  She did this work from home.  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  She really enjoys gardening biking and swimming at the [facility] and doing exercise.    She is being seen for   Chief Complaints and History of Present Illnesses   Patient presents with     Musculoskeletal Problem     Left elbow     Consult     REF:Yanni [facility] CNP         History of Present Illness:   Mechanism of Injury: Patient was hanging a picture and standing on a chair when she fell on her left side and heard a crack.  Location: Left elbow  Duration of Pain: Since date of injury which was 12/17/2023  Rating of Pain: 6 out of 10  Pain Quality: Achy  Pain is better with: Rest  Pain is worse with: Activity  Treatment so far consists of: Patient was seen at the [facility] emergency department on 12/17/2023 and an x-ray was done of the humerus showing no fracture.  Patient was offered a CT scan as she was very concerned that she had a hairline fracture.  Patient decided not to do the CT and was placed in a sling and had follow-up with orthopedics referred.   Associated Features: Denies numbness or tingling shooting burning electric pain.  Prior history of related problems: No previous major injury or trauma or surgery to the left elbow.  Pain is Limiting: Heavy use of left elbow and arm  Here to: Orthopedic consultation  The Pain Has: Gotten better  Additional History: Wants to make sure she does not have a fracture.      Patient's past medical, surgical, social and family histories reviewed.      Past Medical History:   Diagnosis Date     Cancer (H) Jan 2020    Basal Cell Carcinoma     Depressive disorder      Essential hypertension      History of blood transfusion 2007    needed from hysterectomy surgery     Type 2 diabetes mellitus (H)         Past Surgical History:   Procedure Laterality Date     ABDOMEN SURGERY  C section 1986     BACK SURGERY  1993     BIOPSY  Feb 3 2021    basal cell I also had breast biopsies     COLONOSCOPY  2006     COSMETIC SURGERY  2021    under eye basal cell     GYN SURGERY  2007    Hysterectomy     ORTHOPEDIC SURGERY  2020    Left wrist     WRIST SURGERY      fracture and then a plate.        Medications:  losartan-hydrochlorothiazide (HYZAAR) 50-12.5 MG tablet, Take 1 tablet by mouth daily  multivitamin w/minerals (CENTRUM ADULTS) tablet, Take 1 tablet by mouth daily  Vitamin D, Cholecalciferol, 25 MCG (1000 UT) TABS, Take 1,000 Units by mouth daily  ezetimibe (ZETIA) 10 MG tablet, Take 1 tablet by mouth daily (Patient not taking: Reported on 12/19/2023)    No current facility-administered medications on file prior to visit.      Allergies   Allergen Reactions     Iodinated Contrast Media Diarrhea and Nausea and Vomiting     Iodine Anaphylaxis     Codeine      Vomiting      Dilaudid [Hydromorphone]      Eucalyptus Oil      Ibuprofen      Up set stomach, sweating, throw up, diarrhea      Morphine      vomiting     Other [No Clinical Screening - See Comments]      IV dye      Pravastatin      Shellfish-Derived Products      Sulfa Antibiotics        Social History     Occupational History     Not on file   Tobacco Use     Smoking status: Never     Smokeless tobacco: Never   Vaping Use     Vaping Use: Never used   Substance and Sexual Activity     Alcohol use: Not Currently     Comment: Once a year maybe at the most     Drug use: Never     Sexual activity: Not Currently     Partners: Male     Birth control/protection: Other     Comment: Hysterectomy 2007       Family History  "  Problem Relation Age of Onset     Diabetes Other         Amanda  type 1     Diabetes Father         Type 2     Coronary Artery Disease Father         2 open Heart sx  May 2020 at 92     Hypertension Father      Diabetes Other      Coronary Artery Disease Paternal Grandmother          at 93     Hypertension Paternal Grandmother      Hypertension Sister      Hyperlipidemia Sister      Hyperlipidemia Sister      Breast Cancer Sister         Ductal w/mastectomy     Osteoporosis Sister      Cerebrovascular Disease Maternal Grandfather      Breast Cancer Cousin         Maternal 1st cousin      Breast Cancer Cousin         Maternal 1st cousin     Breast Cancer Cousin         Maternal 1st cousin     Breast Cancer Cousin         Maternal 1st cousin and all of his sisters     Colon Cancer Maternal Grandmother          at 84     Prostate Cancer Paternal Grandfather      Other Cancer Mother         Melanoma-Brain mets     Depression Brother        REVIEW OF SYSTEMS  10 point review systems performed otherwise negative as noted as per history of present illness.    Physical Exam:  Vitals: /78 (BP Location: Right arm, Patient Position: Sitting, Cuff Size: Adult Regular)   Temp 97.9  F (36.6  C) (Temporal)   Ht 1.613 m (5' 3.5\")   Wt 58.1 kg (128 lb)   BMI 22.32 kg/m    BMI= Body mass index is 22.32 kg/m .    Constitutional: healthy, alert and no acute distress   Psychiatric: mentation appears normal and affect normal/bright  NEURO: no focal deficits, CMS intact left upper extremity   RESP: Normal with easy respirations and no use of accessory muscles to breathe, no audible wheezing or retractions  CV: +2 radial pulse and her hand is warm to palpation.   SKIN: No erythema, rashes, excoriation, or breakdown. No evidence of infection.   MUSCULOSKELETAL:  INSPECTION of left elbow: No gross deformities, erythema, edema, ecchymosis, atrophy or fasciculations.   PALPATION: No tenderness medial or lateral " epicondyle or the olecranon or in the cubital fossa.  No tenderness forearm, wrist or upper arm.  No increased warmth.   ROM: Passive: Elbow flexion 155 degrees, elbow extension full, supination 90, pronation 90. The range of motion is without catching locking or pain.      STRENGTH: 5 out of 5 biceps and triceps as well as pronation and supination and , interosseous and thumb without pain.   SPECIAL TEST: Stable to varus and valgus stress.  GAIT: non-antalgic  Lymph: no palpable lymph nodes    Diagnostic Modalities:  Recent Results (from the past 744 hour(s))   Humerus XR,  G/E 2 views, left    Narrative    EXAM: XR HUMERUS LEFT G/E 2 VIEWS  LOCATION: [facility]  DATE: 12/17/2023    INDICATION: Fall, pain.  COMPARISON: None.      Impression    IMPRESSION: No acute left humerus fracture or dislocation. There are mild degenerative changes at the left elbow joint.   I agree with above reading.    X-rays of elbow done today showing no fracture no dislocation no tumor and good joint spacing and good alignment within the x-ray.    Independent visualization of the images was performed.    Impression: 1.  2 days status post left elbow contusion.    Plan:  All of the above pertinent physical exam and imaging modalities findings was reviewed with Shruthi.    FOCUSED PLAN:  On exam she has acceptable range of motion and strength and is stable elbow.  X-rays reviewed of humerus and also got x-rays today of elbow showing no fracture.  Instructed on range of motion of elbow with flexion extension supination pronation 3-5 times a day and gradual increase of use of the left upper extremity.  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  We will keep her off for 3 weeks and then she can return to work without restrictions at that time.  We took in a form to fill out and we will get this back to her and also send it to her work.  Follow-up as needed.    Re-x-ray on  return: No      This note was dictated with [facility].    Kiel Penny PA-C        Again, thank you for allowing me to participate in the care of your patient.        Sincerely,        Kiel Penny PA-C

## 2024-01-02 ENCOUNTER — OFFICE VISIT (OUTPATIENT)
Dept: ORTHOPEDICS | Facility: CLINIC | Age: 69
End: 2024-01-02
Payer: COMMERCIAL

## 2024-01-02 VITALS
WEIGHT: 131 LBS | TEMPERATURE: 97.2 F | SYSTOLIC BLOOD PRESSURE: 144 MMHG | HEIGHT: 62 IN | DIASTOLIC BLOOD PRESSURE: 78 MMHG | BODY MASS INDEX: 24.11 KG/M2

## 2024-01-02 DIAGNOSIS — S50.12XD CONTUSION OF ELBOW AND FOREARM, LEFT, SUBSEQUENT ENCOUNTER: Primary | ICD-10-CM

## 2024-01-02 PROCEDURE — 99213 OFFICE O/P EST LOW 20 MIN: CPT | Performed by: PHYSICIAN ASSISTANT

## 2024-01-02 RX ORDER — CETIRIZINE HYDROCHLORIDE 10 MG/1
10 TABLET ORAL DAILY
COMMUNITY

## 2024-01-02 ASSESSMENT — PAIN SCALES - GENERAL: PAINLEVEL: MILD PAIN (2)

## 2024-01-02 NOTE — LETTER
January 2, 2024      Shruthi Rivas  [address] [facility] [address]        To Whom It May Concern:    Shruthi Rivas  was seen on 1-2-2024.  Patient will continue to be off work till 1-9-2024. Then she can return to work with the following restriction: No work greater than 6 hour days until 1-, then can return to work without restrictions.        Sincerely,        Kiel Penny PA-C

## 2024-01-02 NOTE — PROGRESS NOTES
"Office Visit-Follow up    Chief Complaint: Shruthi Rivas is a 68 year old female who is being seen for   Chief Complaint   Patient presents with    RECHECK     1. Left elbow contusion.       History of Present Illness:   Mechanism of Injury:  Patient was hanging a picture and standing on a chair when she fell on her left side and heard a crack.   Location: Left elbow posterior lateral  Duration of Pain: Since date of injury 16 days ago  Rating of Pain: Minimal  Pain Quality: Achy  Pain is better with: Not pushing on the elbow  Pain is worse with: Pushing on the posterior lateral portion of the elbow  Treatment so far consists of: Patient was last seen on 12/19/2023 for the left elbow contusion.  X-rays of the humerus were reviewed as well as elbow showing no fracture.  Patient was instructed on range of motion with flexion extension supination pronation 3-5 times a day gradual increase of use of left upper extremity.  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   Associated Features: Denies numbness or tingling shooting burning electric pain.  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    REVIEW OF SYSTEMS  Review of systems negative other than positive findings in HPI.    Physical Exam:  Vitals: BP (!) 144/78 (BP Location: Right arm, Cuff Size: Adult Regular)   Temp 97.2  F (36.2  C) (Temporal)   Ht 1.581 m (5' 2.25\")   Wt 59.4 kg (131 lb)   BMI 23.77 kg/m    BMI= Body mass index is 23.77 kg/m .  Constitutional: healthy, alert and no acute distress   Psychiatric: mentation appears normal and affect normal/bright  NEURO: no focal deficits, CMS intact left upper extremity   RESP: Normal with easy respirations and no use of accessory muscles to breathe, no audible wheezing or retractions  CV: +2 radial pulse and her hand is " warm to palpation.   SKIN: No erythema, rashes, excoriation, or breakdown. No evidence of infection.    MUSCULOSKELETAL:  INSPECTION of left elbow: Very faint ecchymosis noted on the posterior dorsal portion of the elbow.  No gross deformities, erythema, edema, atrophy or fasciculations.   PALPATION: Very slight lateral and posterior elbow tenderness.  No tenderness medial or the olecranon or in the cubital fossa.  No tenderness forearm, wrist or upper arm.  No increased warmth.  Distal biceps tendon intact  ROM: Passive: Elbow flexion 155 degrees, elbow extension full, supination 90, pronation 90. The range of motion is without catching locking or pain.      STRENGTH: 5 out of 5 biceps and triceps as well as pronation and supination and  without pain.   SPECIAL TEST: none  GAIT: non-antalgic on  Lymph: no palpable lymph nodes      Diagnostic Modalities:  Recent Results (from the past 744 hour(s))   Humerus XR,  G/E 2 views, left    Narrative    EXAM: XR HUMERUS LEFT G/E 2 VIEWS  LOCATION: [facility]  DATE: 12/17/2023    INDICATION: Fall, pain.  COMPARISON: None.      Impression    IMPRESSION: No acute left humerus fracture or dislocation. There are mild degenerative changes at the left elbow joint.   XR Elbow Left G/E 3 Views    Narrative    XR ELBOW LEFT G/E 3 VIEWS   12/19/2023 8:41 AM     HISTORY: Left elbow pain  COMPARISON: 8/19/2023       Impression    IMPRESSION: Normal alignment. No acute fracture. Mild/moderate  degenerative arthritis. Small elbow joint effusion.    JASIEL MANDEL MD         SYSTEM ID:  [id number]     I agree with the above readings.    Independent visualization of the images was performed.      Impression: 1.  16 days status post left elbow contusion.    Plan:  All of the above pertinent physical exam and imaging modalities findings was reviewed with Shruthi.      FOCUSED PLAN:   Patient pain has progressed and on exam has good range of motion and very  minimal tenderness at the lateral posterior elbow.  She is mostly being seen today to solidify her work restrictions.  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  I wrote work restrictions for her to be off work until 1/9/2024 on her previous visit.  At this point she does not feel completely comfortable doing 10-hour shifts which she might have to do when she goes back.  Today I wrote her to be off until 1/9/2024 and then she can go back to work with restrictions of no more than 6 hours/day until 1/23/2024 and at that time she can return to work without restrictions.  Since she was getting better she has no specific point tenderness I did not feel she needed another x-ray.  She can follow-up on an as-needed basis.    Re-x-ray on return: No      This note was dictated with [employer].    Kiel Penny PA-C

## 2024-01-02 NOTE — LETTER
"    1/2/2024         RE: Shruthi Rivas  [address] [facility] [address]        Dear Colleague,    Thank you for referring your patient, Shruthi Rivas, to the [facility]. Please see a copy of my visit note below.    Office Visit-Follow up    Chief Complaint: Shruthi Rivas is a 68 year old female who is being seen for   Chief Complaint   Patient presents with     RECHECK     1. Left elbow contusion.       History of Present Illness:   Mechanism of Injury:  Patient was hanging a picture and standing on a chair when she fell on her left side and heard a crack.   Location: Left elbow posterior lateral  Duration of Pain: Since date of injury 16 days ago  Rating of Pain: Minimal  Pain Quality: Achy  Pain is better with: Not pushing on the elbow  Pain is worse with: Pushing on the posterior lateral portion of the elbow  Treatment so far consists of: Patient was last seen on 12/19/2023 for the left elbow contusion.  X-rays of the humerus were reviewed as well as elbow showing no fracture.  Patient was instructed on range of motion with flexion extension supination pronation 3-5 times a day gradual increase of use of left upper extremity.  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   Associated Features: Denies numbness or tingling shooting burning electric pain.  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    REVIEW OF SYSTEMS  Review of systems negative other than positive findings in HPI.    Physical Exam:  Vitals: BP (!) 144/78 (BP Location: Right arm, Cuff Size: Adult Regular)   Temp 97.2  F (36.2  C) (Temporal)   Ht 1.581 m (5' 2.25\")   Wt 59.4 kg (131 lb)   BMI 23.77 kg/m    BMI= Body mass index is 23.77 kg/m .  Constitutional: healthy, alert and no acute distress   Psychiatric: mentation " appears normal and affect normal/bright  NEURO: no focal deficits, CMS intact left upper extremity   RESP: Normal with easy respirations and no use of accessory muscles to breathe, no audible wheezing or retractions  CV: +2 radial pulse and her hand is warm to palpation.   SKIN: No erythema, rashes, excoriation, or breakdown. No evidence of infection.    MUSCULOSKELETAL:  INSPECTION of left elbow: Very faint ecchymosis noted on the posterior dorsal portion of the elbow.  No gross deformities, erythema, edema, atrophy or fasciculations.   PALPATION: Very slight lateral and posterior elbow tenderness.  No tenderness medial or the olecranon or in the cubital fossa.  No tenderness forearm, wrist or upper arm.  No increased warmth.  Distal biceps tendon intact  ROM: Passive: Elbow flexion 155 degrees, elbow extension full, supination 90, pronation 90. The range of motion is without catching locking or pain.      STRENGTH: 5 out of 5 biceps and triceps as well as pronation and supination and  without pain.   SPECIAL TEST: none  GAIT: non-antalgic on  Lymph: no palpable lymph nodes      Diagnostic Modalities:  Recent Results (from the past 744 hour(s))   Humerus XR,  G/E 2 views, left    Narrative    EXAM: XR HUMERUS LEFT G/E 2 VIEWS  LOCATION: [facility]  DATE: 12/17/2023    INDICATION: Fall, pain.  COMPARISON: None.      Impression    IMPRESSION: No acute left humerus fracture or dislocation. There are mild degenerative changes at the left elbow joint.   XR Elbow Left G/E 3 Views    Narrative    XR ELBOW LEFT G/E 3 VIEWS   12/19/2023 8:41 AM     HISTORY: Left elbow pain  COMPARISON: 8/19/2023       Impression    IMPRESSION: Normal alignment. No acute fracture. Mild/moderate  degenerative arthritis. Small elbow joint effusion.    JASIEL MANDEL MD         SYSTEM ID:  [id number]     I agree with the above readings.    Independent visualization of the images was  performed.      Impression: 1.  16 days status post left elbow contusion.    Plan:  All of the above pertinent physical exam and imaging modalities findings was reviewed with Shruthi.      FOCUSED PLAN:   Patient pain has progressed and on exam has good range of motion and very minimal tenderness at the lateral posterior elbow.  She is mostly being seen today to solidify her work restrictions.  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  I wrote work restrictions for her to be off work until 1/9/2024 on her previous visit.  At this point she does not feel completely comfortable doing 10-hour shifts which she might have to do when she goes back.  Today I wrote her to be off until 1/9/2024 and then she can go back to work with restrictions of no more than 6 hours/day until 1/23/2024 and at that time she can return to work without restrictions.  Since she was getting better she has no specific point tenderness I did not feel she needed another x-ray.  She can follow-up on an as-needed basis.    Re-x-ray on return: No      This note was dictated with [employer].    Kiel Penny PA-C                Again, thank you for allowing me to participate in the care of your patient.        Sincerely,        Kiel Penny PA-C

## 2024-01-04 ENCOUNTER — PATIENT OUTREACH (OUTPATIENT)
Dept: CARE COORDINATION | Facility: CLINIC | Age: 69
End: 2024-01-04
Payer: COMMERCIAL

## 2024-01-05 ENCOUNTER — MYC MEDICAL ADVICE (OUTPATIENT)
Dept: INTERNAL MEDICINE | Facility: CLINIC | Age: 69
End: 2024-01-05
Payer: COMMERCIAL

## 2024-01-05 DIAGNOSIS — F33.8 SEASONAL DEPRESSION (H): Primary | ICD-10-CM

## 2024-01-05 NOTE — TELEPHONE ENCOUNTER
Patient last had an virtual visit with Dr. Stringer on 8/22/23 and last office visit with you on 1/23/23.     Does she need a visit with you first?    Amira Izquierdo RN on 1/5/2024 at 12:47 PM

## 2024-01-18 ENCOUNTER — PATIENT OUTREACH (OUTPATIENT)
Dept: CARE COORDINATION | Facility: CLINIC | Age: 69
End: 2024-01-18
Payer: COMMERCIAL

## 2024-01-22 ENCOUNTER — TRANSFERRED RECORDS (OUTPATIENT)
Dept: MULTI SPECIALTY CLINIC | Facility: CLINIC | Age: 69
End: 2024-01-22
Payer: COMMERCIAL

## 2024-01-22 LAB — RETINOPATHY: NORMAL

## 2024-02-06 ASSESSMENT — ANXIETY QUESTIONNAIRES
3. WORRYING TOO MUCH ABOUT DIFFERENT THINGS: SEVERAL DAYS
7. FEELING AFRAID AS IF SOMETHING AWFUL MIGHT HAPPEN: NOT AT ALL
IF YOU CHECKED OFF ANY PROBLEMS ON THIS QUESTIONNAIRE, HOW DIFFICULT HAVE THESE PROBLEMS MADE IT FOR YOU TO DO YOUR WORK, TAKE CARE OF THINGS AT HOME, OR GET ALONG WITH OTHER PEOPLE: SOMEWHAT DIFFICULT
1. FEELING NERVOUS, ANXIOUS, OR ON EDGE: SEVERAL DAYS
8. IF YOU CHECKED OFF ANY PROBLEMS, HOW DIFFICULT HAVE THESE MADE IT FOR YOU TO DO YOUR WORK, TAKE CARE OF THINGS AT HOME, OR GET ALONG WITH OTHER PEOPLE?: SOMEWHAT DIFFICULT
GAD7 TOTAL SCORE: 6
7. FEELING AFRAID AS IF SOMETHING AWFUL MIGHT HAPPEN: NOT AT ALL
GAD7 TOTAL SCORE: 6
5. BEING SO RESTLESS THAT IT IS HARD TO SIT STILL: NOT AT ALL
4. TROUBLE RELAXING: SEVERAL DAYS
6. BECOMING EASILY ANNOYED OR IRRITABLE: SEVERAL DAYS
2. NOT BEING ABLE TO STOP OR CONTROL WORRYING: MORE THAN HALF THE DAYS

## 2024-02-09 ENCOUNTER — OFFICE VISIT (OUTPATIENT)
Dept: INTERNAL MEDICINE | Facility: CLINIC | Age: 69
End: 2024-02-09
Payer: COMMERCIAL

## 2024-02-09 VITALS
DIASTOLIC BLOOD PRESSURE: 82 MMHG | WEIGHT: 133 LBS | HEIGHT: 62 IN | SYSTOLIC BLOOD PRESSURE: 120 MMHG | OXYGEN SATURATION: 99 % | RESPIRATION RATE: 16 BRPM | HEART RATE: 82 BPM | TEMPERATURE: 97.2 F | BODY MASS INDEX: 24.48 KG/M2

## 2024-02-09 DIAGNOSIS — F33.8 SEASONAL DEPRESSION (H): Primary | ICD-10-CM

## 2024-02-09 PROCEDURE — 99213 OFFICE O/P EST LOW 20 MIN: CPT | Performed by: INTERNAL MEDICINE

## 2024-02-09 RX ORDER — RESPIRATORY SYNCYTIAL VIRUS VACCINE 120MCG/0.5
0.5 KIT INTRAMUSCULAR ONCE
Qty: 1 EACH | Refills: 0 | Status: CANCELLED | OUTPATIENT
Start: 2024-02-09 | End: 2024-02-09

## 2024-02-09 ASSESSMENT — ENCOUNTER SYMPTOMS: NERVOUS/ANXIOUS: 1
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
## 2024-02-25 ENCOUNTER — HEALTH MAINTENANCE LETTER (OUTPATIENT)
Age: 69
End: 2024-02-25

## 2024-06-24 DIAGNOSIS — F33.8 SEASONAL DEPRESSION (H): ICD-10-CM

## 2024-06-24 NOTE — TELEPHONE ENCOUNTER
Patient is going on a trip to [place], she will need this medication refill sent to the pharmacy so that she can pick it up on before Friday, 06-28-24    Fang GUPTA/

## 2024-07-14 ENCOUNTER — HEALTH MAINTENANCE LETTER (OUTPATIENT)
Age: 69
End: 2024-07-14

## 2024-07-21 ENCOUNTER — PATIENT OUTREACH (OUTPATIENT)
Dept: CARE COORDINATION | Facility: CLINIC | Age: 69
End: 2024-07-21
Payer: COMMERCIAL

## 2024-08-23 ENCOUNTER — OFFICE VISIT (OUTPATIENT)
Dept: INTERNAL MEDICINE | Facility: CLINIC | Age: 69
End: 2024-08-23
Payer: COMMERCIAL

## 2024-08-23 VITALS
DIASTOLIC BLOOD PRESSURE: 86 MMHG | SYSTOLIC BLOOD PRESSURE: 138 MMHG | WEIGHT: 128.7 LBS | HEIGHT: 63 IN | TEMPERATURE: 97.2 F | RESPIRATION RATE: 16 BRPM | OXYGEN SATURATION: 99 % | HEART RATE: 68 BPM | BODY MASS INDEX: 22.8 KG/M2

## 2024-08-23 DIAGNOSIS — E11.9 TYPE 2 DIABETES MELLITUS WITHOUT COMPLICATION, WITHOUT LONG-TERM CURRENT USE OF INSULIN (H): ICD-10-CM

## 2024-08-23 DIAGNOSIS — Z01.818 PRE-OP EXAM: Primary | ICD-10-CM

## 2024-08-23 DIAGNOSIS — Z78.0 POST-MENOPAUSAL: ICD-10-CM

## 2024-08-23 DIAGNOSIS — Z12.11 SCREEN FOR COLON CANCER: ICD-10-CM

## 2024-08-23 LAB
ANION GAP SERPL CALCULATED.3IONS-SCNC: 12 MMOL/L (ref 7–15)
BUN SERPL-MCNC: 18.7 MG/DL (ref 8–23)
CALCIUM SERPL-MCNC: 10.2 MG/DL (ref 8.8–10.4)
CHLORIDE SERPL-SCNC: 101 MMOL/L (ref 98–107)
CHOLEST SERPL-MCNC: 324 MG/DL
CREAT SERPL-MCNC: 0.78 MG/DL (ref 0.51–0.95)
CREAT UR-MCNC: 78 MG/DL
EGFRCR SERPLBLD CKD-EPI 2021: 81 ML/MIN/1.73M2
FASTING STATUS PATIENT QL REPORTED: YES
FASTING STATUS PATIENT QL REPORTED: YES
GLUCOSE SERPL-MCNC: 100 MG/DL (ref 70–99)
HBA1C MFR BLD: 5.5 %
HCO3 SERPL-SCNC: 28 MMOL/L (ref 22–29)
HDLC SERPL-MCNC: 58 MG/DL
LDLC SERPL CALC-MCNC: 220 MG/DL
MICROALBUMIN UR-MCNC: <12 MG/L
MICROALBUMIN/CREAT UR: NORMAL MG/G{CREAT}
NONHDLC SERPL-MCNC: 266 MG/DL
POTASSIUM SERPL-SCNC: 4.6 MMOL/L (ref 3.4–5.3)
SODIUM SERPL-SCNC: 141 MMOL/L (ref 135–145)
TRIGL SERPL-MCNC: 228 MG/DL

## 2024-08-23 PROCEDURE — 80048 BASIC METABOLIC PNL TOTAL CA: CPT | Performed by: INTERNAL MEDICINE

## 2024-08-23 PROCEDURE — 82570 ASSAY OF URINE CREATININE: CPT | Performed by: INTERNAL MEDICINE

## 2024-08-23 PROCEDURE — 82043 UR ALBUMIN QUANTITATIVE: CPT | Performed by: INTERNAL MEDICINE

## 2024-08-23 PROCEDURE — 36415 COLL VENOUS BLD VENIPUNCTURE: CPT | Performed by: INTERNAL MEDICINE

## 2024-08-23 PROCEDURE — 99214 OFFICE O/P EST MOD 30 MIN: CPT | Performed by: INTERNAL MEDICINE

## 2024-08-23 PROCEDURE — G2211 COMPLEX E/M VISIT ADD ON: HCPCS | Performed by: INTERNAL MEDICINE

## 2024-08-23 PROCEDURE — 83036 HEMOGLOBIN GLYCOSYLATED A1C: CPT | Performed by: INTERNAL MEDICINE

## 2024-08-23 PROCEDURE — 80061 LIPID PANEL: CPT | Performed by: INTERNAL MEDICINE

## 2024-08-23 ASSESSMENT — PAIN SCALES - GENERAL: PAINLEVEL: NO PAIN (0)

## 2024-08-23 NOTE — PROGRESS NOTES
Preoperative Evaluation  [address] [address]  Phone: [phone]  Primary Provider: Luis Alston MD  Pre-op Performing Provider: Luis Alston MD  Aug 23, 2024           8/20/2024   Surgical Information   What procedure is being done? Colonoscopy   Facility or Hospital where procedure/surgery will be performed: [facility]   Who is doing the procedure / surgery? Dr. Jose Luis Roblero   Date of surgery / procedure: 8/29/24   Time of surgery / procedure: 7:30   Where do you plan to recover after surgery? at home with family      Fax number for surgical facility: [facility] phone number [phone]  Fax [phone]    Assessment & Plan     The proposed surgical procedure is considered LOW risk.    Problem List Items Addressed This Visit    None  Visit Diagnoses       Pre-op exam    -  Primary    Screen for colon cancer        Type 2 diabetes mellitus without complication, without long-term current use of insulin (H)        Relevant Orders    HEMOGLOBIN A1C    Lipid panel reflex to direct LDL Fasting    BASIC METABOLIC PANEL    Albumin Random Urine Quantitative with Creat Ratio    Post-menopausal        Relevant Orders    DEXA HIP/PELVIS/SPINE - Future          Patient here for preop for colonoscopy.  She does have high blood pressure we will continue her losartan and hydrochlorothiazide on that day.    Diabetes we will check her A1c as she is overdue for lab work.  She is approved for surgery.            Possible Sleep Apnea:  monitor post op        - No identified additional risk factors other than previously addressed        Recommendation  Approval given to proceed with proposed procedure, without further diagnostic evaluation.      The longitudinal plan of care for the diagnosis(es)/condition(s) as documented were addressed during this visit. Due to the added complexity in care, I will continue to support  Shruthi in the subsequent management and with ongoing continuity of care.        Shravan Hawkins is a 69 year old, presenting for the following:  Pre-Op Exam  Needs a colonoscopy done,         8/23/2024     9:30 AM   Additional Questions   Roomed by Raine ESTRELLA         8/23/2024     9:30 AM   Patient Reported Additional Medications   Patient reports taking the following new medications albuterol inhaler prn     HPI related to upcoming procedure: colonoscopy        8/20/2024   Pre-Op Questionnaire   Have you ever had a heart attack or stroke? No   Have you ever had surgery on your heart or blood vessels, such as a stent placement, a coronary artery bypass, or surgery on an artery in your head, neck, heart, or legs? No   Do you have chest pain with activity? No   Do you have a history of heart failure? No   Do you currently have a cold, bronchitis or symptoms of other infection? (!) UNKNOWN no   Do you have a cough, shortness of breath, or wheezing? No   Do you or anyone in your family have previous history of blood clots? No   Do you or does anyone in your family have a serious bleeding problem such as prolonged bleeding following surgeries or cuts? (!) UNKNOWN no   Have you ever had problems with anemia or been told to take iron pills? No   Have you had any abnormal blood loss such as black, tarry or bloody stools, or abnormal vaginal bleeding? No   Have you ever had a blood transfusion? (!) UNKNOWN   Are you willing to have a blood transfusion if it is medically needed before, during, or after your surgery? Yes   Have you or any of your relatives ever had problems with anesthesia? (!) YES self, Hysterectomy dilaudid gave rash and vomiting    Do you have sleep apnea, excessive snoring or daytime drowsiness? (!) UNKNOWN   Do you have any artifical heart valves or other implanted medical devices like a pacemaker, defibrillator, or continuous glucose monitor? No   Do you have artificial joints? No   Are you  allergic to latex? No        Health Care Directive  Patient does not have a Health Care Directive or Living Will: Discussed advance care planning with patient; information given to patient to review.    Preoperative Review of    reviewed - no record of controlled substances prescribed.      Status of Chronic Conditions:  HYPERTENSION - Patient has longstanding history of HTN , currently denies any symptoms referable to elevated blood pressure. Specifically denies chest pain, palpitations, dyspnea, orthopnea, PND or peripheral edema. Blood pressure readings have been in normal range. Current medication regimen is as listed below. Patient denies any side effects of medication.     There are no problems to display for this patient.     Past Medical History:   Diagnosis Date    Cancer (H) Jan 2020    Basal Cell Carcinoma    Depressive disorder     Essential hypertension     History of blood transfusion 2007    needed from hysterectomy surgery    Type 2 diabetes mellitus (H)      Past Surgical History:   Procedure Laterality Date    ABDOMEN SURGERY  C section 1986    BACK SURGERY  1993    BIOPSY  Feb 3 2021    basal cell I also had breast biopsies    COLONOSCOPY  2006    COSMETIC SURGERY  2021    under eye basal cell    GYN SURGERY  2007    Hysterectomy    ORTHOPEDIC SURGERY  2020    Left wrist    WRIST SURGERY      fracture and then a plate.      Current Outpatient Medications   Medication Sig Dispense Refill    cetirizine (ZYRTEC) 10 MG tablet Take 10 mg by mouth daily.      FLUoxetine (PROZAC) 20 MG capsule Take 1 capsule by mouth once daily 90 capsule 0    losartan-hydrochlorothiazide (HYZAAR) 50-12.5 MG tablet Take 1 tablet by mouth daily 90 tablet 3    multivitamin w/minerals (CENTRUM ADULTS) tablet Take 1 tablet by mouth daily      Vitamin D, Cholecalciferol, 25 MCG (1000 UT) TABS Take 1,000 Units by mouth daily         Allergies   Allergen Reactions    Iodinated Contrast Media Diarrhea and Nausea and  "Vomiting    Iodine Anaphylaxis    Codeine      Vomiting     Dilaudid [Hydromorphone]     Eucalyptus Oil     Ibuprofen      Up set stomach, sweating, throw up, diarrhea     Morphine      vomiting    Other [No Clinical Screening - See Comments]      IV dye     Pravastatin     Shellfish-Derived Products     Sulfa Antibiotics         Social History     Tobacco Use    Smoking status: Never    Smokeless tobacco: Never    Tobacco comments:     Non smoker   Substance Use Topics    Alcohol use: Not Currently     Comment: Drank when I was in my 20's       History   Drug Use Unknown           Review of Systems  CONSTITUTIONAL: NEGATIVE for fever, chills, change in weight  ENT/MOUTH: NEGATIVE for ear, mouth and throat problems  RESP: NEGATIVE for significant cough or SOB  CV: NEGATIVE for chest pain, palpitations or peripheral edema    Objective    BP (!) 148/88 (BP Location: Right arm, Patient Position: Chair)   Pulse 68   Temp 97.2  F (36.2  C) (Temporal)   Resp 16   Ht 1.588 m (5' 2.5\")   Wt 58.4 kg (128 lb 11.2 oz)   SpO2 99%   BMI 23.16 kg/m     Estimated body mass index is 23.16 kg/m  as calculated from the following:    Height as of this encounter: 1.588 m (5' 2.5\").    Weight as of this encounter: 58.4 kg (128 lb 11.2 oz).  Physical Exam  GENERAL: alert and no distress  NECK: no adenopathy, no asymmetry, masses, or scars  RESP: lungs clear to auscultation - no rales, rhonchi or wheezes  CV: regular rate and rhythm, normal S1 S2, no S3 or S4, no murmur, click or rub, no peripheral edema  ABDOMEN: soft, nontender, no hepatosplenomegaly, no masses and bowel sounds normal  MS: no gross musculoskeletal defects noted, no edema    No results for input(s): \"HGB\", \"PLT\", \"INR\", \"NA\", \"POTASSIUM\", \"CR\", \"A1C\" in the last 8760 hours.     Diagnostics  No labs were ordered during this visit.   No EKG required for low risk surgery (cataract, skin procedure, breast biopsy, etc).    Revised Cardiac Risk Index (RCRI)  The patient " has the following serious cardiovascular risks for perioperative complications:   - No serious cardiac risks = 0 points     RCRI Interpretation: 0 points: Class I (very low risk - 0.4% complication rate)         Signed Electronically by: Luis Alston MD  A copy of this evaluation report is provided to the requesting physician.

## 2024-08-28 ENCOUNTER — TELEPHONE (OUTPATIENT)
Dept: INTERNAL MEDICINE | Facility: CLINIC | Age: 69
End: 2024-08-28
Payer: COMMERCIAL

## 2024-08-28 NOTE — TELEPHONE ENCOUNTER
Patient is calling back from message left.  Informed patient of documentation per Dr. Gamaliel MD as stated below.  Patient stated understanding.  Patient stated she has changed her diet and is working on lowering her cholesterol, but at this time does not want to take a statin due to side effects.  Patient stated she received this message on her results from My chart.    Deana Linares RN

## 2024-08-28 NOTE — TELEPHONE ENCOUNTER
----- Message from Luis Alston sent at 8/28/2024 12:46 PM CDT -----  Please call her with recommendations to try statin medication

## 2024-08-29 ENCOUNTER — TRANSFERRED RECORDS (OUTPATIENT)
Dept: MULTI SPECIALTY CLINIC | Facility: CLINIC | Age: 69
End: 2024-08-29

## 2024-08-29 DIAGNOSIS — I10 ESSENTIAL HYPERTENSION: ICD-10-CM

## 2024-08-29 RX ORDER — LOSARTAN POTASSIUM AND HYDROCHLOROTHIAZIDE 12.5; 5 MG/1; MG/1
1 TABLET ORAL DAILY
Qty: 90 TABLET | Refills: 0 | Status: SHIPPED | OUTPATIENT
Start: 2024-08-29

## 2024-10-07 DIAGNOSIS — F33.8 SEASONAL DEPRESSION (H): ICD-10-CM

## 2024-11-05 ENCOUNTER — OFFICE VISIT (OUTPATIENT)
Dept: FAMILY MEDICINE | Facility: CLINIC | Age: 69
End: 2024-11-05
Payer: COMMERCIAL

## 2024-11-05 VITALS
SYSTOLIC BLOOD PRESSURE: 118 MMHG | TEMPERATURE: 97.4 F | DIASTOLIC BLOOD PRESSURE: 74 MMHG | BODY MASS INDEX: 24.11 KG/M2 | WEIGHT: 131 LBS | HEIGHT: 62 IN | HEART RATE: 88 BPM | OXYGEN SATURATION: 99 %

## 2024-11-05 DIAGNOSIS — J01.01 ACUTE RECURRENT MAXILLARY SINUSITIS: Primary | ICD-10-CM

## 2024-11-05 PROCEDURE — 99213 OFFICE O/P EST LOW 20 MIN: CPT

## 2024-11-05 RX ORDER — CEFUROXIME AXETIL 500 MG/1
500 TABLET ORAL 2 TIMES DAILY
Qty: 20 TABLET | Refills: 0 | Status: SHIPPED | OUTPATIENT
Start: 2024-11-05 | End: 2024-11-15

## 2024-11-05 ASSESSMENT — ENCOUNTER SYMPTOMS
COUGH: 1
SINUS PRESSURE: 1

## 2024-11-05 NOTE — PROGRESS NOTES
Assessment & Plan     Acute recurrent maxillary sinusitis  - cefuroxime (CEFTIN) 500 MG tablet; Take 1 tablet (500 mg) by mouth 2 times daily for 10 days.      I spent a total of 5 minutes on the day of the visit.   Time spent by me doing chart review, history and exam, documentation and further activities per the note        See Patient Instructions  There are no Patient Instructions on file for this visit.    Shravan Hawkins is a 69 year old, presenting for the following health issues:  Sinus Problem        11/5/2024     9:09 AM   Additional Questions   Roomed by Petra PATRICIO     Via the Health Maintenance questionnaire, the patient has reported the following services have been completed -Colonscopy: [facility] 2024-08-29, this information has been sent to the abstraction team.  Via the Health Maintenance questionnaire, the patient has reported the following services have been completed DEXA: [facility] 2024-11-07, this information has not been sent to the abstraction team.  History of Present Illness       Reason for visit:  Sinus infection    She eats 2-3 servings of fruits and vegetables daily.She consumes 1 sweetened beverage(s) daily.She exercises with enough effort to increase her heart rate 10 to 19 minutes per day.  She exercises with enough effort to increase her heart rate 3 or less days per week.   She is taking medications regularly.     Patient presents for concerns of a sinus infection.  She reports that she has had bilateral sinus maxillary pressure and pain.  Reports that she gets a sinus infection about once a year around the same time.  Believes hetastarch with allergy or cold like symptoms and progresses into sinus infection.  Symptoms have been present for more than 1 month.  She has allergies to sulfa antibiotics and reports that penicillin antibiotics do not work for her.  She has had cefuroxime in the past with good relief.  She notes thick nasal  "congestion and yellow to green rhinorrhea.  She also has been having some nosebleeds.  She does not use Flonase nasal spray because it gives her nosebleeds.  She is not able to take ibuprofen.  She also has some ear pressure on the right side.                  Objective    /74   Pulse 88   Temp 97.4  F (36.3  C) (Temporal)   Ht 1.575 m (5' 2\")   Wt 59.4 kg (131 lb)   SpO2 99%   BMI 23.96 kg/m    Body mass index is 23.96 kg/m .  Physical Exam               Signed Electronically by: Jenifer Rodarte PA-C    "

## 2024-11-07 ENCOUNTER — HOSPITAL ENCOUNTER (OUTPATIENT)
Dept: MAMMOGRAPHY | Facility: CLINIC | Age: 69
Discharge: HOME | End: 2024-11-07
Attending: INTERNAL MEDICINE
Payer: COMMERCIAL

## 2024-11-07 ENCOUNTER — HOSPITAL ENCOUNTER (OUTPATIENT)
Dept: BONE DENSITY | Facility: CLINIC | Age: 69
Discharge: HOME | End: 2024-11-07
Attending: INTERNAL MEDICINE
Payer: COMMERCIAL

## 2024-11-07 DIAGNOSIS — Z12.31 VISIT FOR SCREENING MAMMOGRAM: ICD-10-CM

## 2024-11-07 DIAGNOSIS — Z78.0 POST-MENOPAUSAL: ICD-10-CM

## 2024-11-07 PROCEDURE — 77063 BREAST TOMOSYNTHESIS BI: CPT

## 2024-11-07 PROCEDURE — 77080 DXA BONE DENSITY AXIAL: CPT

## 2024-12-01 ENCOUNTER — HEALTH MAINTENANCE LETTER (OUTPATIENT)
Age: 69
End: 2024-12-01

## 2024-12-03 ENCOUNTER — HOSPITAL ENCOUNTER (OUTPATIENT)
Dept: GENERAL RADIOLOGY | Facility: CLINIC | Age: 69
Discharge: HOME | End: 2024-12-03
Attending: INTERNAL MEDICINE
Payer: COMMERCIAL

## 2024-12-03 ENCOUNTER — OFFICE VISIT (OUTPATIENT)
Dept: INTERNAL MEDICINE | Facility: CLINIC | Age: 69
End: 2024-12-03
Payer: COMMERCIAL

## 2024-12-03 VITALS
BODY MASS INDEX: 23.98 KG/M2 | HEIGHT: 62 IN | WEIGHT: 130.3 LBS | OXYGEN SATURATION: 98 % | SYSTOLIC BLOOD PRESSURE: 146 MMHG | HEART RATE: 95 BPM | TEMPERATURE: 99.4 F | DIASTOLIC BLOOD PRESSURE: 88 MMHG | RESPIRATION RATE: 15 BRPM

## 2024-12-03 DIAGNOSIS — R05.1 ACUTE COUGH: ICD-10-CM

## 2024-12-03 DIAGNOSIS — E11.9 TYPE 2 DIABETES MELLITUS WITHOUT COMPLICATION, WITHOUT LONG-TERM CURRENT USE OF INSULIN (H): Primary | ICD-10-CM

## 2024-12-03 DIAGNOSIS — J06.9 UPPER RESPIRATORY TRACT INFECTION, UNSPECIFIED TYPE: ICD-10-CM

## 2024-12-03 LAB — RADIOLOGIST FLAGS: NORMAL

## 2024-12-03 PROCEDURE — 87635 SARS-COV-2 COVID-19 AMP PRB: CPT | Performed by: INTERNAL MEDICINE

## 2024-12-03 PROCEDURE — 99213 OFFICE O/P EST LOW 20 MIN: CPT | Performed by: INTERNAL MEDICINE

## 2024-12-03 PROCEDURE — 71046 X-RAY EXAM CHEST 2 VIEWS: CPT

## 2024-12-03 ASSESSMENT — PAIN SCALES - GENERAL: PAINLEVEL_OUTOF10: NO PAIN (0)

## 2024-12-03 ASSESSMENT — ENCOUNTER SYMPTOMS
COUGH: 1
WHEEZING: 1
SHORTNESS OF BREATH: 1
SORE THROAT: 1
RHINORRHEA: 1

## 2024-12-03 ASSESSMENT — COPD QUESTIONNAIRES: COPD: 0

## 2024-12-03 ASSESSMENT — LIFESTYLE VARIABLES: SMOKING_STATUS: 0

## 2024-12-03 NOTE — PROGRESS NOTES
"      Shravan Hawkins is a 69 year old, presenting for the following health issues:  Sinus Problem and Cough      12/3/2024     9:30 AM   Additional Questions   Roomed by Constance         12/3/2024     9:30 AM   Patient Reported Additional Medications   Patient reports taking the following new medications none     History of Present Illness       Reason for visit:  Sinus is getting worse after antibiotics.  I don't think they worked  Symptom onset:  More than a month  Symptoms include:  Congestion, productive cough, headache, fever, sinus pressure, fatigue, ear congestion  Symptom intensity:  Moderate  Symptom progression:  Worsening  Had these symptoms before:  Yes  Has tried/received treatment for these symptoms:  Yes  Previous treatment was successful:  No  What makes it worse:  Laying down  What makes it better:  Fluids    She eats 2-3 servings of fruits and vegetables daily.She consumes 0 sweetened beverage(s) daily.She exercises with enough effort to increase her heart rate 10 to 19 minutes per day.  She exercises with enough effort to increase her heart rate 3 or less days per week.   She is taking medications regularly.                EMR reviewed including:             Complaint, History of Chief Complaint, Corresponding Review of Systems, and Complaint Specific Physical Examination.    #1   Persistent cough, nasal congestion, upper respiratory symptoms.  Seen recently and started on cefuroxime.  \"Not getting better\".  \"Just wanted Cipro\".  Taking food and fluid adequately.  Sleeping somewhat poorly.  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  Airflow is brisk, no intercostal retraction or stridor is noted.  Occasional mildly productive   coughing is noted during visit.   HEART:  regular without rubs, clicks, gallops, or murmurs. PMI is nondisplaced. Upstrokes are brisk. S1,S2 are heard.   GI: " "Abdomen is soft, without rebound, guarding or tenderness. Bowel sounds are appropriate. No renal bruits are heard.   ENT: Pharynx is non-erythemous, moderate clear PND, no significant nasal obstruction, TM's not red or retracted, hearing intact bilaterally. No carotid bruits are heard. No JVD seen. Thyroid is not nodular or enlarged.        Patient has been interviewed, applicable history and applied review of systems have been performed.    Vital Signs:   BP (!) 146/88 (BP Location: Right arm, Patient Position: Sitting, Cuff Size: Adult Regular)   Pulse 95   Temp 99.4  F (37.4  C) (Oral)   Resp 15   Ht 1.576 m (5' 2.05\")   Wt 59.1 kg (130 lb 4.8 oz)   SpO2 98%   BMI 23.80 kg/m        Decision Making    Problem and Complexity     1. Acute cough  Obtain chest x-ray to rule out pneumonia  (Requested by patient and her nurse/daughter)  - XR Chest 2 Views; Future    2. Upper respiratory tract infection, unspecified type  No signs of bacterial infection at this time.  As she does drive a schoolbus, will need to rule out COVID.  - COVID-19 Virus (Coronavirus) by PCR Nose    3. Type 2 diabetes mellitus without complication, without long-term current use of insulin (H) (Primary)  Well-controlled with diet alone.  Follow-up with primary care provider as scheduled                               FOLLOW UP   I have asked the patient to make an appointment for followup with primary care provider as needed or as scheduled.    Regarding routine vaccinations:  I have reviewed the patient's vaccination schedule and discussed the benefits of prophylactic vaccination in detail.  I recommend the patient contact their pharmacist for vaccinations.  Discussed that most insurance companies now favor reimbursement to the pharmacies and it will financially behoove the patient to have vaccinations performed at their pharmacy.        I have carefully explained the diagnosis and treatment options to the patient.  The patient has displayed " an understanding of the above, and all subsequent questions were answered.      DO NATA Morrison    Portions of this note were produced using [employer]  Although every attempt at real-time proof reading has been made, occasional grammar/syntax errors may have been missed.

## 2024-12-03 NOTE — LETTER
December 3, 2024      Shruthi Rivas  [address] [facility] [address]        To Whom It May Concern:    Shruthi Rivas  was seen on 12/3/24.  Please excuse her  until 12/8/24 due to illness.        Sincerely,        Camilo Stringer, DO

## 2024-12-04 LAB — SARS-COV-2 RNA RESP QL NAA+PROBE: NEGATIVE

## 2024-12-05 ENCOUNTER — TELEPHONE (OUTPATIENT)
Dept: INTERNAL MEDICINE | Facility: CLINIC | Age: 69
End: 2024-12-05
Payer: COMMERCIAL

## 2024-12-05 DIAGNOSIS — R91.8 PULMONARY NODULES: Primary | ICD-10-CM

## 2024-12-05 NOTE — TELEPHONE ENCOUNTER
DATE/TIME OF CALL RECEIVED FROM LAB:  12/05/24 at 2:39 PM   LAB TEST:  Chest Xray  LAB VALUE:  Incidental Finding on Chest Xray from 12/3;     IMPRESSION: 10 mm left perihilar nodule is indeterminate, but may  represent a calcified granuloma or summation artifact. Recommend  further evaluation with contrast-enhanced chest CT. Otherwise negative  chest.  PROVIDER NOTIFIED?: Yes  PROVIDER NAME: Dr Stringer  DATE/TIME LAB VALUE REPORTED TO PROVIDER: 12/5/2024 2:40pm  MECHANISM OF PROVIDER NOTIFICATION:  Routed HP to Provider and supporting nurse pool    PROVIDER RESPONSE:    Connie Soto, RN, BSN

## 2024-12-05 NOTE — PROGRESS NOTES
The patient's recent chest x-ray suggest the presence of a nodule.  It is recommended that a confirmational CAT scan be performed.    It is entirely possible that this nodule is entirely benign but the CAT scan is necessary to confirm that.    I have placed an order for a CAT scan.  Please contact the patient and help her set this up.    We will follow-up on this when the results are available.    Myke

## 2024-12-06 ENCOUNTER — TELEPHONE (OUTPATIENT)
Dept: INTERNAL MEDICINE | Facility: CLINIC | Age: 69
End: 2024-12-06
Payer: COMMERCIAL

## 2024-12-06 DIAGNOSIS — R91.8 PULMONARY NODULES: Primary | ICD-10-CM

## 2024-12-06 NOTE — TELEPHONE ENCOUNTER
Please let her know radiology wanted a CT scan with contrast but due to her allergy could try the CT without contrast first and then see what additional information is present.  Therefore I have ordered a CT without contrast she can schedule and do.

## 2024-12-06 NOTE — TELEPHONE ENCOUNTER
Provider: Patient needs to have prednisone and/or benadryl ordered if she needs to have contrast ordered with CT scan.      S: CT Scan with Contrast ordered, allergic    B: Patient is calling in with need.  Patient had CT scan with contrast ordered by Dr. Stringer. Patient states she is allergic to the contrast dye. She will need Prednisone and Benadryl ordered if she is going to need the contrast.  She has not scheduled it yet as she wants to make sure the orders for meds are placed first, or to know if provider would prefer to then not have contrast.     A: Advised patient that message will be sent to provider.     R: patient verbalized understanding. She would like a call back to let her know what the plan will be.

## 2024-12-06 NOTE — TELEPHONE ENCOUNTER
Called and spoke with patient and relayed provider message.     Per patient she is aware of her allergy and is president in having a CT scan with contrast. Patient is requesting the Prednisone and Benadryl ordered with CT contrast. She is stating if this isn't doable she will go elsewhere.     Routing to provider to review.    Bhavani Guerrero,  VF

## 2024-12-09 NOTE — TELEPHONE ENCOUNTER
Do we have the pre procedure plan for IV contrast and meds and doses radiology wants.      Still pending. I will check status tomorrow.

## 2024-12-10 RX ORDER — DIPHENHYDRAMINE HCL 25 MG
25 CAPSULE ORAL EVERY 6 HOURS PRN
Qty: 2 CAPSULE | Refills: 0 | Status: SHIPPED | OUTPATIENT
Start: 2024-12-10

## 2024-12-10 RX ORDER — METHYLPREDNISOLONE 32 MG/1
32 TABLET ORAL DAILY
Qty: 2 TABLET | Refills: 0 | Status: SHIPPED | OUTPATIENT
Start: 2024-12-10

## 2024-12-10 RX ORDER — METHYLPREDNISOLONE 4 MG/1
TABLET ORAL
Qty: 16 TABLET | Refills: 0 | Status: SHIPPED | OUTPATIENT
Start: 2024-12-10 | End: 2024-12-10

## 2024-12-10 NOTE — TELEPHONE ENCOUNTER
I reordered her chest CT with contrast and sent methylprednisolone steroid for 12 hours before and 2 hours before and Benadryl 2 hours before.

## 2024-12-17 ENCOUNTER — TELEPHONE (OUTPATIENT)
Dept: INTERNAL MEDICINE | Facility: CLINIC | Age: 69
End: 2024-12-17
Payer: COMMERCIAL

## 2024-12-17 NOTE — TELEPHONE ENCOUNTER
Patient Quality Outreach    Patient is due for the following:   Diabetes -  A1C and Foot Exam  Physical Annual Wellness Visit      Topic Date Due    Diptheria Tetanus Pertussis (DTAP/TDAP/TD) Vaccine (2 - Td or Tdap) 10/12/2022    COVID-19 Vaccine (8 - 2024-25 season) 11/29/2024       Action(s) Taken:   Schedule a Annual Wellness Visit    Type of outreach:    Sent [employer] message.    Questions for provider review:    None           Lyla Nettles

## 2024-12-30 ENCOUNTER — HOSPITAL ENCOUNTER (OUTPATIENT)
Dept: CT IMAGING | Facility: CLINIC | Age: 69
Discharge: HOME | End: 2024-12-30
Attending: INTERNAL MEDICINE | Admitting: INTERNAL MEDICINE
Payer: COMMERCIAL

## 2024-12-30 DIAGNOSIS — R91.8 PULMONARY NODULES: ICD-10-CM

## 2024-12-30 LAB
CREAT BLD-MCNC: 0.8 MG/DL (ref 0.5–1)
EGFRCR SERPLBLD CKD-EPI 2021: >60 ML/MIN/1.73M2

## 2024-12-30 PROCEDURE — 82565 ASSAY OF CREATININE: CPT

## 2024-12-30 PROCEDURE — 250N000011 HC RX IP 250 OP 636: Performed by: INTERNAL MEDICINE

## 2024-12-30 PROCEDURE — 250N000009 HC RX 250: Performed by: INTERNAL MEDICINE

## 2024-12-30 PROCEDURE — 71260 CT THORAX DX C+: CPT

## 2024-12-30 RX ORDER — IOPAMIDOL 755 MG/ML
500 INJECTION, SOLUTION INTRAVASCULAR ONCE
Status: COMPLETED | OUTPATIENT
Start: 2024-12-30 | End: 2024-12-30

## 2024-12-30 RX ADMIN — SODIUM CHLORIDE 60 ML: 9 INJECTION, SOLUTION INTRAVENOUS at 11:47

## 2024-12-30 RX ADMIN — IOPAMIDOL 65 ML: 755 INJECTION, SOLUTION INTRAVENOUS at 11:47

## 2024-12-31 DIAGNOSIS — I10 ESSENTIAL HYPERTENSION: ICD-10-CM

## 2025-01-02 RX ORDER — LOSARTAN POTASSIUM AND HYDROCHLOROTHIAZIDE 12.5; 5 MG/1; MG/1
1 TABLET ORAL DAILY
Qty: 90 TABLET | Refills: 0 | Status: SHIPPED | OUTPATIENT
Start: 2025-01-02

## 2025-02-04 ENCOUNTER — NURSE TRIAGE (OUTPATIENT)
Dept: INTERNAL MEDICINE | Facility: CLINIC | Age: 70
End: 2025-02-04
Payer: COMMERCIAL

## 2025-02-04 NOTE — TELEPHONE ENCOUNTER
RN Triage    Patient Contact    Attempt # 1    Was call answered?  No.  Left message on voicemail with information to call me back. and sent Mahogany Cantor RN on 2/4/2025 at 3:42 PM

## 2025-02-04 NOTE — TELEPHONE ENCOUNTER
Nurse Triage SBAR    Is this a 2nd Level Triage? NO    Situation: Patient has ongoing cough with SOB with activity.    Background: Ongoing cough, ill since Dec 2nd and was treated with Z-alcira    Assessment: Patient reports ongoing cough with SOB with activity. Patient reports she wheezes and feels SOB when she gets up and moves. Having several coughing fits while on the phone with RN. No fevers but did spike temps last week.     Declines UC or ED this evening due to no openings and wants to wait until tomorrow to be seen.     Protocol Recommended Disposition:   GO TO OFFICE NOW    Recommendation: Can patient be worked in tomorrow and wait until tomorrow to be seen?     Routed to provider    Does the patient meet one of the following criteria for ADS visit consideration? No    Manoj Cantor RN on 2/4/2025 at 4:40 PM      Reason for Disposition   MILD difficulty breathing (e.g., minimal/no SOB at rest, SOB with walking, pulse < 100) of new-onset or worse than normal    Additional Information   Negative: SEVERE difficulty breathing (e.g., struggling for each breath, speaks in single words, pulse > 120)   Negative: Breathing stopped and hasn't returned   Negative: Choking on something   Negative: Bluish (or gray) lips or face   Negative: Difficult to awaken or acting confused (e.g., disoriented, slurred speech)   Negative: Passed out (e.g., fainted, lost consciousness, blacked out and was not responding)   Negative: Wheezing started suddenly after medicine, an allergic food, or bee sting   Negative: Stridor (harsh sound while breathing in)   Negative: Slow, shallow and weak breathing   Negative: Sounds like a life-threatening emergency to the triager   Negative: Chest pain   Negative: Wheezing (high pitched whistling sound) and previous asthma attacks or use of asthma medicines   Negative: Breathing difficulty and within 14 days of COVID-19 EXPOSURE (close contact) with someone diagnosed with COVID-19 (e.g., COVID test  "positive)   Negative: Breathing difficulty and COVID-19 is widespread in the community   Negative: Breathing diffculty and only present when coughing   Negative: Breathing difficulty and only from stuffy nose   Negative: Breathing diffculty and only from stuffy nose or runny nose from common cold   Negative: MODERATE difficulty breathing (e.g., speaks in phrases, SOB even at rest, pulse 100-120) of new-onset or worse than normal   Negative: Oxygen level (e.g., pulse oximetry) 90% or lower   Negative: Wheezing can be heard across the room   Negative: Drooling or spitting out saliva (because can't swallow)   Negative: Any history of prior \"blood clot\" in leg or lungs   Negative: Illness requiring prolonged bedrest in past month (e.g., immobilization, long hospital stay)   Negative: Hip or leg fracture (broken bone) in past month (or had cast on leg or ankle in past month)   Negative: Major surgery in the past month   Negative: Long-distance travel in past month (e.g., car, bus, train, plane; with trip lasting 6 or more hours)   Negative: Cancer treatment in past six months (or has cancer now)   Negative: Extra heartbeats, irregular heart beating, or heart is beating very fast (i.e., 'palpitations')   Negative: Fever > 103 F (39.4 C)   Negative: Fever > 101 F (38.3 C) and over 60 years of age   Negative: Fever > 100 F (37.8 C) and bedridden (e.g., nursing home patient, stroke, chronic illness, recovering from surgery)   Negative: Fever > 100 F (37.8 C) and diabetes mellitus or weak immune system (e.g., HIV positive, cancer chemo, splenectomy, organ transplant, chronic steroids)   Negative: Periods where breathing stops and then resumes normally and bedridden (e.g., nursing home patient, CVA)   Negative: Pregnant or postpartum (from 0 to 6 weeks after delivery)   Negative: Patient sounds very sick or weak to the triager    Protocols used: Breathing Difficulty-A-OH    "

## 2025-02-04 NOTE — TELEPHONE ENCOUNTER
Appointments in Next Year      Feb 05, 2025 2:40 PM  (Arrive by 2:20 PM)  Provider Visit with Luis Alston MD  [facility] ([facility] ) [phone]          Patient verbalized understanding and all questions answered.       Renuka Meadows RN  [facility] - Registered Nurse  Clinic Triage Tam   February 4, 2025

## 2025-02-05 ENCOUNTER — OFFICE VISIT (OUTPATIENT)
Dept: INTERNAL MEDICINE | Facility: CLINIC | Age: 70
End: 2025-02-05
Payer: COMMERCIAL

## 2025-02-05 VITALS
WEIGHT: 129 LBS | BODY MASS INDEX: 23.74 KG/M2 | TEMPERATURE: 99.1 F | RESPIRATION RATE: 20 BRPM | SYSTOLIC BLOOD PRESSURE: 160 MMHG | DIASTOLIC BLOOD PRESSURE: 90 MMHG | HEIGHT: 62 IN | OXYGEN SATURATION: 97 % | HEART RATE: 98 BPM

## 2025-02-05 DIAGNOSIS — R06.2 WHEEZING: ICD-10-CM

## 2025-02-05 DIAGNOSIS — J06.9 UPPER RESPIRATORY TRACT INFECTION, UNSPECIFIED TYPE: Primary | ICD-10-CM

## 2025-02-05 DIAGNOSIS — E11.9 TYPE 2 DIABETES MELLITUS WITHOUT COMPLICATION, WITHOUT LONG-TERM CURRENT USE OF INSULIN (H): ICD-10-CM

## 2025-02-05 PROCEDURE — G2211 COMPLEX E/M VISIT ADD ON: HCPCS | Performed by: INTERNAL MEDICINE

## 2025-02-05 PROCEDURE — 99213 OFFICE O/P EST LOW 20 MIN: CPT | Performed by: INTERNAL MEDICINE

## 2025-02-05 RX ORDER — DOXYCYCLINE HYCLATE 100 MG
100 TABLET ORAL 2 TIMES DAILY
Qty: 20 TABLET | Refills: 0 | Status: SHIPPED | OUTPATIENT
Start: 2025-02-05

## 2025-02-05 RX ORDER — PREDNISONE 20 MG/1
20 TABLET ORAL DAILY
Qty: 7 TABLET | Refills: 0 | Status: SHIPPED | OUTPATIENT
Start: 2025-02-05

## 2025-02-05 RX ORDER — ALBUTEROL SULFATE 0.83 MG/ML
2.5 SOLUTION RESPIRATORY (INHALATION) EVERY 6 HOURS PRN
Qty: 90 ML | Refills: 0 | Status: SHIPPED | OUTPATIENT
Start: 2025-02-05

## 2025-02-05 ASSESSMENT — PATIENT HEALTH QUESTIONNAIRE - PHQ9
SUM OF ALL RESPONSES TO PHQ QUESTIONS 1-9: 15
10. IF YOU CHECKED OFF ANY PROBLEMS, HOW DIFFICULT HAVE THESE PROBLEMS MADE IT FOR YOU TO DO YOUR WORK, TAKE CARE OF THINGS AT HOME, OR GET ALONG WITH OTHER PEOPLE: VERY DIFFICULT
SUM OF ALL RESPONSES TO PHQ QUESTIONS 1-9: 15

## 2025-02-05 ASSESSMENT — PAIN SCALES - GENERAL: PAINLEVEL_OUTOF10: NO PAIN (0)

## 2025-02-05 ASSESSMENT — ENCOUNTER SYMPTOMS: COUGH: 1

## 2025-02-05 NOTE — LETTER
February 5, 2025      Shruthi Rivas  [address] [facility] [address]        To Whom It May Concern:    Shruthi Rivas was seen in our clinic. She may return to work on February 10, 2025 without restrictions.      Sincerely,        Luis Alston MD    Electronically signed

## 2025-02-05 NOTE — PROGRESS NOTES
Assessment & Plan   Problem List Items Addressed This Visit    None  Visit Diagnoses       Upper respiratory tract infection, unspecified type    -  Primary    Relevant Medications    albuterol (PROVENTIL) (2.5 MG/3ML) 0.083% neb solution    doxycycline hyclate (VIBRA-TABS) 100 MG tablet    predniSONE (DELTASONE) 20 MG tablet    Type 2 diabetes mellitus without complication, without long-term current use of insulin (H)        Relevant Medications    predniSONE (DELTASONE) 20 MG tablet    Wheezing        Relevant Medications    albuterol (PROVENTIL) (2.5 MG/3ML) 0.083% neb solution    doxycycline hyclate (VIBRA-TABS) 100 MG tablet    predniSONE (DELTASONE) 20 MG tablet           Patient here for cough and sob, has been bothering her since December. Has had a better week at the end of December. Chest ct then was ok, no recurring wheezing and cough and sinus congestion. Works as a  so lots of exposures.     Will treat again for wheezing and URI, will use an antibiotic even though this could be viral.     Will continue albuterol nebulizers as those from [place] do work but needs to take the 4 times a day. Will also give low dose steroids as they make her shaky but she needs them. Will have 5 days off work to try and get better.     Let me know if not getting better next week.        The longitudinal plan of care for the diagnosis(es)/condition(s) as documented were addressed during this visit. Due to the added complexity in care, I will continue to support Shruthi in the subsequent management and with ongoing continuity of care.         Depression Screening Follow Up        2/5/2025     2:13 PM   PHQ   PHQ-9 Total Score 15    Q9: Thoughts of better off dead/self-harm past 2 weeks Not at all       Patient-reported           Follow Up Actions Taken             Subjective   Shruthi is a 69 year old, presenting for the following health issues:  Cough (Coughing since 12/2/24, SOB, wheezing and sleeps sitting  "up/Seen at [facility] 1/11/25)      2/5/2025     2:15 PM   Additional Questions   Roomed by Mariama     Via the Health Maintenance questionnaire, the patient has reported the following services have been completed -Eye Exam: [facility] [place] 2024-02-13, this information has been sent to the abstraction team.  Cough    History of Present Illness       Reason for visit:  Coughing for about 3months back pain Tuesday Feb 4   She is taking medications regularly.     She had visit here in November given ceftin for possible sinus infection. Sinus worse after Ceftin seen in Dec 3rd chest xray was ok, then ct for 10 mm nodule.   Dec 24-30 felt better, had chest ct then, calcified granulomatous disease     Then worse in January and went to urgent care, given tessalon and zpak.     Still has thick yellow sinus drainage, some from cough.  Occasional fever.          Objective    BP (!) 160/90   Pulse 98   Temp 99.1  F (37.3  C) (Temporal)   Resp 20   Ht 1.575 m (5' 2\")   Wt 58.5 kg (129 lb)   SpO2 97%   BMI 23.59 kg/m    Body mass index is 23.59 kg/m .  Physical Exam   NAD  Heart regular  Lungs are clear on inspiration and wheezing on exhalation through, causing coughing.         Signed Electronically by: Luis Alston MD  "

## 2025-02-11 ENCOUNTER — TELEPHONE (OUTPATIENT)
Dept: INTERNAL MEDICINE | Facility: CLINIC | Age: 70
End: 2025-02-11
Payer: COMMERCIAL

## 2025-02-11 NOTE — TELEPHONE ENCOUNTER
Patient Quality Outreach    Patient is due for the following:   Physical Annual Wellness Visit      Topic Date Due    Diptheria Tetanus Pertussis (DTAP/TDAP/TD) Vaccine (2 - Td or Tdap) 10/12/2022       Action(s) Taken:   Schedule a Annual Wellness Visit    Type of outreach:    Sent [employer] message.    Questions for provider review:    None           Lyla Nettles

## 2025-02-17 NOTE — TELEPHONE ENCOUNTER
Patient Quality Outreach    Patient is due for the following:   Physical Annual Wellness Visit      Topic Date Due    Diptheria Tetanus Pertussis (DTAP/TDAP/TD) Vaccine (2 - Td or Tdap) 10/12/2022       Action(s) Taken:   Schedule a Annual Wellness Visit    Type of outreach:    Patient messaged back stating she does not want to set up wellness visit     Questions for provider review:    None           Lyla Nettles

## 2025-03-13 ENCOUNTER — TRANSFERRED RECORDS (OUTPATIENT)
Dept: MULTI SPECIALTY CLINIC | Facility: CLINIC | Age: 70
End: 2025-03-13

## 2025-03-13 LAB — RETINOPATHY: NORMAL

## 2025-03-15 ENCOUNTER — HEALTH MAINTENANCE LETTER (OUTPATIENT)
Age: 70
End: 2025-03-15

## 2025-03-29 DIAGNOSIS — I10 ESSENTIAL HYPERTENSION: ICD-10-CM

## 2025-03-31 RX ORDER — LOSARTAN POTASSIUM AND HYDROCHLOROTHIAZIDE 12.5; 5 MG/1; MG/1
1 TABLET ORAL DAILY
Qty: 90 TABLET | Refills: 0 | Status: SHIPPED | OUTPATIENT
Start: 2025-03-31

## 2025-06-28 ENCOUNTER — HEALTH MAINTENANCE LETTER (OUTPATIENT)
Age: 70
End: 2025-06-28

## 2025-07-25 PROBLEM — H52.13 MYOPIA OF BOTH EYES WITH ASTIGMATISM AND PRESBYOPIA: Status: ACTIVE | Noted: 2018-02-05

## 2025-07-25 PROBLEM — H04.123 DRY EYES, BILATERAL: Status: ACTIVE | Noted: 2018-02-05

## 2025-07-25 PROBLEM — J45.20 MILD INTERMITTENT ASTHMA WITHOUT COMPLICATION: Status: ACTIVE | Noted: 2019-04-17

## 2025-07-25 PROBLEM — H52.4 MYOPIA OF BOTH EYES WITH ASTIGMATISM AND PRESBYOPIA: Status: ACTIVE | Noted: 2018-02-05

## 2025-07-25 PROBLEM — J30.1 ALLERGIC RHINITIS DUE TO POLLEN: Status: ACTIVE | Noted: 2019-05-15

## 2025-07-25 PROBLEM — Z86.39 HISTORY OF VITAMIN D DEFICIENCY: Status: ACTIVE | Noted: 2017-12-04

## 2025-07-25 PROBLEM — D36.9 INTRADUCTAL PAPILLOMATOSIS: Status: ACTIVE | Noted: 2017-05-08

## 2025-07-25 PROBLEM — I10 ESSENTIAL HYPERTENSION: Status: ACTIVE | Noted: 2025-07-25

## 2025-07-25 PROBLEM — H52.203 MYOPIA OF BOTH EYES WITH ASTIGMATISM AND PRESBYOPIA: Status: ACTIVE | Noted: 2018-02-05

## 2025-07-25 PROBLEM — R20.2 PARESTHESIA OF BOTH FEET: Status: ACTIVE | Noted: 2025-07-25

## 2025-07-28 ENCOUNTER — LAB (OUTPATIENT)
Dept: LAB | Facility: CLINIC | Age: 70
End: 2025-07-28
Payer: COMMERCIAL

## 2025-07-28 DIAGNOSIS — I10 ESSENTIAL HYPERTENSION: ICD-10-CM

## 2025-07-28 DIAGNOSIS — R73.03 PREDIABETES: ICD-10-CM

## 2025-07-28 DIAGNOSIS — E78.5 HYPERLIPIDEMIA, UNSPECIFIED HYPERLIPIDEMIA TYPE: ICD-10-CM

## 2025-07-28 LAB
ANION GAP SERPL CALCULATED.3IONS-SCNC: 13 MMOL/L (ref 7–15)
BUN SERPL-MCNC: 17.5 MG/DL (ref 8–23)
CALCIUM SERPL-MCNC: 9.8 MG/DL (ref 8.8–10.4)
CHLORIDE SERPL-SCNC: 98 MMOL/L (ref 98–107)
CHOLEST SERPL-MCNC: 347 MG/DL
CREAT SERPL-MCNC: 0.8 MG/DL (ref 0.51–0.95)
CREAT UR-MCNC: 20.5 MG/DL
EGFRCR SERPLBLD CKD-EPI 2021: 78 ML/MIN/1.73M2
EST. AVERAGE GLUCOSE BLD GHB EST-MCNC: 117 MG/DL
FASTING STATUS PATIENT QL REPORTED: YES
FASTING STATUS PATIENT QL REPORTED: YES
GLUCOSE SERPL-MCNC: 105 MG/DL (ref 70–99)
HBA1C MFR BLD: 5.7 %
HCO3 SERPL-SCNC: 26 MMOL/L (ref 22–29)
HDLC SERPL-MCNC: 61 MG/DL
LDLC SERPL CALC-MCNC: 240 MG/DL
MICROALBUMIN UR-MCNC: <12 MG/L
MICROALBUMIN/CREAT UR: NORMAL MG/G{CREAT}
NONHDLC SERPL-MCNC: 286 MG/DL
POTASSIUM SERPL-SCNC: 4.6 MMOL/L (ref 3.4–5.3)
SODIUM SERPL-SCNC: 137 MMOL/L (ref 135–145)
TRIGL SERPL-MCNC: 230 MG/DL

## 2025-07-28 PROCEDURE — 83036 HEMOGLOBIN GLYCOSYLATED A1C: CPT

## 2025-07-28 PROCEDURE — 36415 COLL VENOUS BLD VENIPUNCTURE: CPT

## 2025-07-28 PROCEDURE — 80061 LIPID PANEL: CPT

## 2025-07-28 PROCEDURE — 82570 ASSAY OF URINE CREATININE: CPT

## 2025-07-28 PROCEDURE — 80048 BASIC METABOLIC PNL TOTAL CA: CPT

## 2025-07-28 PROCEDURE — 82043 UR ALBUMIN QUANTITATIVE: CPT
